# Patient Record
Sex: FEMALE | Race: WHITE | Employment: UNEMPLOYED | URBAN - METROPOLITAN AREA
[De-identification: names, ages, dates, MRNs, and addresses within clinical notes are randomized per-mention and may not be internally consistent; named-entity substitution may affect disease eponyms.]

---

## 2017-12-05 ENCOUNTER — OFFICE VISIT (OUTPATIENT)
Dept: INTERNAL MEDICINE CLINIC | Age: 37
End: 2017-12-05

## 2017-12-05 VITALS
RESPIRATION RATE: 14 BRPM | HEIGHT: 62 IN | SYSTOLIC BLOOD PRESSURE: 107 MMHG | DIASTOLIC BLOOD PRESSURE: 76 MMHG | OXYGEN SATURATION: 99 % | WEIGHT: 160 LBS | TEMPERATURE: 98.2 F | BODY MASS INDEX: 29.44 KG/M2 | HEART RATE: 119 BPM

## 2017-12-05 DIAGNOSIS — E78.2 MIXED HYPERLIPIDEMIA: Primary | ICD-10-CM

## 2017-12-05 DIAGNOSIS — G44.59 OTHER COMPLICATED HEADACHE SYNDROME: ICD-10-CM

## 2017-12-05 DIAGNOSIS — E55.9 VITAMIN D DEFICIENCY: ICD-10-CM

## 2017-12-05 NOTE — PROGRESS NOTES
HISTORY OF PRESENT ILLNESS  Saadia Acosta is a 40 y.o. female. HPI   Patient reports head pressure. She notes Motrin does not help. She states she has migraines but this sensation is different. Patient notes sensitivity to light, nausea, and throat tightness with head pressure. Patient states she is eating and drinking okay without food or liquids getting stuck in throat. She states it feels she can not breathe. She denies checking BP during these episodes. She states the head pressure is intermittent. She states her anxiety is stable and not contributing. She expresses concern about aneurysm due to [place]. In addition, patient reports bilateral inner thigh pain. She denies exercising. She does have varicose veins that vascular would have done something for in the past   Hyperlipidemia:  Cardiovascular risk analysis - 40 y.o. female LDL goal is under 130. ROS: not currently on medications, no TIA's, no chest pain on exertion, no dyspnea on exertion, no swelling of ankles. Tolerating meds, no myalgias or other side effects noted  New concerns: Last LDL was 168, 9/20/2016. Review of Systems   All other systems reviewed and are negative. Physical Exam   Constitutional: She is oriented to person, place, and time. She appears well-developed and well-nourished. HENT:   Head: Normocephalic and atraumatic. Right Ear: External ear normal.   Left Ear: External ear normal.   Nose: Nose normal.   Mouth/Throat: Oropharynx is clear and moist.   Eyes: Conjunctivae and EOM are normal.   Neck: Normal range of motion. Neck supple. Carotid bruit is not present. No thyroid mass and no thyromegaly present. Cardiovascular: Normal rate, regular rhythm, S1 normal, S2 normal, normal heart sounds and intact distal pulses. Pulmonary/Chest: Effort normal and breath sounds normal.   Abdominal: Soft. Normal appearance and bowel sounds are normal. There is no hepatosplenomegaly. There is no tenderness.    Musculoskeletal: Normal range of motion. Neurological: She is alert and oriented to person, place, and time. She has normal strength. No cranial nerve deficit or sensory deficit. Coordination normal.   Skin: Skin is warm, dry and intact. No abrasion and no rash noted. Psychiatric: She has a normal mood and affect. Her behavior is normal. Judgment and thought content normal.   Nursing note and vitals reviewed. ASSESSMENT and PLAN  Diagnoses and all orders for this visit:    1. Mixed hyperlipidemia  Stable, patient not on meds. I do not recommend any change in medications. We will see what it comes [facility]  -     CBC W/O DIFF  -     METABOLIC PANEL, COMPREHENSIVE  -     LIPID PANEL  -     TSH 3RD GENERATION  -     CBC W/O DIFF  -     METABOLIC PANEL, COMPREHENSIVE  -     LIPID PANEL    2. Other complicated headache syndrome  Suspect muscles around neck are tight/squeezing and causing head pressure. Patient can swallow liquids and foods alright. Encouraged patient to follow up with neurology as she has strong FH of aneurysm and she is concerned about her risks  -     REFERRAL TO NEUROLOGY    3. Vitamin D deficiency  Will monitor.  -     VITAMIN D, 25 HYDROXY      Strongly encouraged patient to participate in exercise regularly. FH of aneurysm. lab results and schedule of future lab studies reviewed with patient  reviewed diet, exercise and weight control    Written by Jostin Novoa, as dictated by Tatianna Rivera MD.     Current diagnosis and concerns discussed with pt at length. Understands risks and benefits or current treatment plan and medications and accepts the treatment and medication with any possible risks. Pt asks appropriate questions which were answered. Pt instructed to call with any concerns or problems.

## 2017-12-06 ENCOUNTER — OFFICE VISIT (OUTPATIENT)
Dept: NEUROLOGY | Age: 37
End: 2017-12-06

## 2017-12-06 ENCOUNTER — TELEPHONE (OUTPATIENT)
Dept: INTERNAL MEDICINE CLINIC | Age: 37
End: 2017-12-06

## 2017-12-06 VITALS
BODY MASS INDEX: 29.72 KG/M2 | SYSTOLIC BLOOD PRESSURE: 112 MMHG | RESPIRATION RATE: 20 BRPM | HEIGHT: 62 IN | WEIGHT: 161.5 LBS | DIASTOLIC BLOOD PRESSURE: 60 MMHG

## 2017-12-06 DIAGNOSIS — R11.0 NAUSEA: ICD-10-CM

## 2017-12-06 DIAGNOSIS — R51.9 PRESSURE IN HEAD: Primary | ICD-10-CM

## 2017-12-06 DIAGNOSIS — Z82.49 FAMILY HISTORY OF CEREBRAL ANEURYSM: ICD-10-CM

## 2017-12-06 DIAGNOSIS — R41.3 MEMORY DIFFICULTY: ICD-10-CM

## 2017-12-06 RX ORDER — IBUPROFEN 200 MG
TABLET ORAL
COMMUNITY

## 2017-12-06 RX ORDER — ACETAMINOPHEN 325 MG/1
TABLET ORAL
COMMUNITY

## 2017-12-06 NOTE — PROGRESS NOTES
Name:  Natalie Alves      :  1980    PCP:   Iliana Lake MD      Referring:  As above  MRN:   [id number]    Chief Complaint:   Chief Complaint   Patient presents with    Eye Problem    Dizziness    Headache    Memory Loss       HISTORY OF PRESENT ILLNESS:     This is a 40 y.o. female with PMHx migraine headache who presents for evaluation of head pressure and associated symptoms which she feels are different than her typical migraine. She says for the past 2 weeks she's been having pressure sensation inside her head, not a headache. Also has zapping pain, split second occurring in random areas of head. Generally only happens once per day, not multiple times in a day, or may not have it for a few days. Has been intermittent nausea x 1 month but feels more nauseated when has head pressure. No vomiting. Also complaining of intermittent throat tightness in the past few weeks, unrelated to head pressure, but can drink and eat foods without choking. Describes having trouble with memory over the past few weeks, feeling more forgetful, sometimes not able to find right word or saying wrong word. She denies feeling anxious or depressed. Denies any recent head injury. Denies any recent, significant increase in life stressors. Reports that father passed away suddenly from cerebral aneurysm in mid 46s and sister also had ruptured cerebral aneurysm in mid 35s (had craniotomy). Pt concerned she may have aneurysm. Complete Review of Systems: chest pain, constipation, fatigue, joint pain, dyspnea, muscle pain; otherwise as noted in HPI     No Known Allergies  Past Medical History:   Diagnosis Date    Encounter for insertion of mirena IUD 5/6/15    Mirena placed    Headache     migraines    Hypercholesterolemia      Current Outpatient Prescriptions   Medication Sig Dispense Refill    acetaminophen (TYLENOL) 325 mg tablet Take  by mouth every four (4) hours as needed for Pain.       ibuprofen (MOTRIN) 200 mg tablet Take  by mouth.  levonorgestrel (MIRENA) 20 mcg/24 hr (5 years) IUD 1 Each by IntraUTERine route once.  ranitidine (ZANTAC) 150 mg tablet Take 1 Tab by mouth two (2) times a day. 30 Tab 0     Past Surgical History:   Procedure Laterality Date    HX DILATION AND CURETTAGE       Family History   Problem Relation Age of Onset    Stroke Father     Elevated Lipids Father     Arthritis-osteo Mother     Cancer Paternal Uncle      abdomen    Diabetes Paternal Uncle     Cancer Paternal Grandmother      susana    Hypertension Neg Hx      Social History     Social History    Marital status:      Spouse name: N/A    Number of children: N/A    Years of education: N/A     Occupational History    Not on file. Social History Main Topics    Smoking status: Never Smoker    Smokeless tobacco: Never Used    Alcohol use No    Drug use: No    Sexual activity: Yes     Partners: Male     Birth control/ protection: IUD     Other Topics Concern    Not on file     Social History Narrative       PHYSICAL EXAM  Vitals:    12/06/17 1328   BP: 112/60   Resp: 20   Weight: 73.3 kg (161 lb 8 oz)   Height: 5' 2\" (1.575 m)       General:  Alert, cooperative, NAD   Head:  Normocephalic, atraumatic. Eyes:  Conjunctivae/corneas clear   Lungs:  Heart:  Non labored breathing  Regular rate, rhythm   Extremities: No edema.    Skin: No rashes    Neurologic Exam       Language: normal  Memory:  Alert, oriented to person, place, situation    Cranial Nerves:  I: smell Not tested   II: visual fields Full to confrontation   II: pupils Equal, round, reactive to light   II: optic disc No papilledema   III,VII: ptosis none   III,IV,VI: extraocular muscles  normal   V: facial light touch sensation  normal   VII: facial muscle function  symmetric   VIII: hearing symmetric   IX: soft palate elevation  normal   XI: sternocleidomastoid strength 5/5   XII: tongue  midline      Motor: normal bulk, tone, strength in all exts  Sensory: intact to LT, PP, temp, vibration x 4 exts   Cerebellar: no rest, postural, or intention tremor  Normal FNF and H-Shin bilaterally  Reflexes: 2+ throughout  Plantar response: not examined  Gait: normal gait including tandem  Romberg negative     Reviewed last clinic note by PCP regarding above symptoms    ASSESSMENT AND PLAN    ICD-10-CM ICD-9-CM    1. Pressure in head R51 784.0 CTA HEAD NECK W CONT   2. Nausea R11.0 787.02 CTA HEAD NECK W CONT   3. Family history of cerebral aneurysm Z82.49 V17.1 CTA HEAD NECK W CONT   4. Memory difficulty R41.3 780.93          40 y.o. female with 2-3 week hx of pressure in head (not headache per pt), intermittent zapping sensations on scalp, nausea over past 1 month, episodic throat tightness. The episodic throat tightness/ choking sensation and zapping sensations on scalp sound more like stress-related symptoms but she denies any ongoing stressors, anxiety, or depression. Has strong FHx aneurysm as described above. D/w her checking MRA vs CTA in regards to evaluating for aneurysm but she has moderate to severe claustrophobia and preferred to go with CTA. Placed that order, will see her back in 2-3 weeks to go over results.         Signed By: Mabel Denson MD     December 6, 2017

## 2017-12-06 NOTE — TELEPHONE ENCOUNTER
Referral Request     Patient was seen by Dr Sage Seay Neurology 12/06/2017  Instead of Dr Derek Lawrence request an updated referral to reflect.     Patient has also been referred to have an CT Scan    Dx R51./ R11.0 / Z82.49 / & CPT 20517

## 2017-12-06 NOTE — MR AVS SNAPSHOT
Visit Information Date & Time Provider Department Dept. Phone Encounter #  
 12/6/2017  1:00 PM Rakesh Holden MD [facility] Neurology [facility] [phone] [id number] Follow-up Instructions Return in about 3 weeks (around 12/27/2017). Routing History Follow-up and Disposition History Upcoming Health Maintenance Date Due DTaP/Tdap/Td series (1 - Tdap) 10/15/2001 Influenza Age 5 to Adult 8/1/2017 PAP AKA CERVICAL CYTOLOGY 5/5/2020 Allergies as of 12/6/2017  Review Complete On: 12/5/2017 By: Keven Kaur LPN No Known Allergies Current Immunizations  Never Reviewed No immunizations on file. Not reviewed this visit You Were Diagnosed With   
  
 Codes Comments Pressure in head    -  Primary ICD-10-CM: P70 ICD-9-CM: 784.0 Nausea     ICD-10-CM: R11.0 ICD-9-CM: 787.02 Family history of cerebral aneurysm     ICD-10-CM: Z82.49 
ICD-9-CM: V17.1 Memory difficulty     ICD-10-CM: R41.3 ICD-9-CM: 780.93 Vitals BP Resp Height(growth percentile) Weight(growth percentile) BMI OB Status 112/60 20 5' 2\" (1.575 m) 161 lb 8 oz (73.3 kg) 29.54 kg/m2 IUD Smoking Status Never Smoker Vitals History BMI and BSA Data Body Mass Index Body Surface Area  
 29.54 kg/m 2 1.79 m 2 Preferred Pharmacy Pharmacy Name Phone [facility] [address]  [address] [phone] Your Updated Medication List  
  
   
This list is accurate as of: 12/6/17  2:18 PM.  Always use your most recent med list.  
  
  
  
  
 ibuprofen 200 mg tablet Commonly known as:  MOTRIN Take  by mouth.  
  
 levonorgestrel 20 mcg/24 hr (5 years) Iud  
Commonly known as:  MIRENA  
1 Each by IntraUTERine route once. raNITIdine 150 mg tablet Commonly known as:  ZANTAC Take 1 Tab by mouth two (2) times a day. TYLENOL 325 mg tablet Generic drug:  acetaminophen Take  by mouth every four (4) hours as needed for Pain. Follow-up Instructions Return in about 3 weeks (around 12/27/2017). To-Do List   
 12/06/2017 Imaging:  CTA HEAD NECK W CONT Patient Instructions [address] What is a living will? A living will is a legal form you use to write down the kind of care you want at the end of your life. It is used by the health professionals who will treat you if you aren't able to decide for yourself. If you put your wishes in writing, your loved ones and others will know what kind of care you want. They won't need to guess. This can ease your mind and be helpful to others. A living will is not the same as an estate or property will. An estate will explains what you want to happen with your money and property after you die. Is a living will a legal document? A living will is a legal document. Each state has its own laws about living yeager. If you move to another state, make sure that your living will is legal in the state where you now live. Or you might use a universal form that has been approved by many states. This kind of form can sometimes be completed and stored online. Your electronic copy will then be available wherever you have a connection to the Internet. In most cases, doctors will respect your wishes even if you have a form from a different state. · You don't need an  to complete a living will. But legal advice can be helpful if your state's laws are unclear, your health history is complicated, or your family can't agree on what should be in your living will. · You can change your living will at any time. Some people find that their wishes about end-of-life care change as their health changes. · In addition to making a living will, think about completing a medical power of  form.  This form lets you name the person you want to make end-of-life treatment decisions for you (your \"health care agent\") if you're not able to. Many hospitals and nursing homes will give you the forms you need to complete a living will and a medical power of . · Your living will is used only if you can't make or communicate decisions for yourself anymore. If you become able to make decisions again, you can accept or refuse any treatment, no matter what you wrote in your living will. · Your state may offer an online registry. This is a place where you can store your living will online so the doctors and nurses who need to treat you can find it right away. What should you think about when creating a living will? Talk about your end-of-life wishes with your family members and your doctor. Let them know what you want. That way the people making decisions for you won't be surprised by your choices. Think about these questions as you make your living will: · Do you know enough about life support methods that might be used? If not, talk to your doctor so you know what might be done if you can't breathe on your own, your heart stops, or you're unable to swallow. · What things would you still want to be able to do after you receive life-support methods? Would you want to be able to walk? To speak? To eat on your own? To live without the help of machines? · If you have a choice, where do you want to be cared for? In your home? At a hospital or nursing home? · Do you want certain Restorationism practices performed if you become very ill? · If you have a choice at the end of your life, where would you prefer to die? At home? In a hospital or nursing home? Somewhere else? · Would you prefer to be buried or cremated? · Do you want your organs to be donated after you die? What should you do with your living will? · Make sure that your family members and your health care agent have copies of your living will. · Give your doctor a copy of your living will to keep in your medical record. If you have more than one doctor, make sure that each one has a copy. · You may want to put a copy of your living will where it can be easily found. Where can you learn more? Go to http://cahna-vanessa.info/. Enter H969 in the search box to learn more about \"Learning About Living [place]. \" Current as of: September 24, 2016 Content Version: 11.4 © 0348-0732 [employer]. Care instructions adapted under license by [employer] (which disclaims liability or warranty for this information). If you have questions about a medical condition or this instruction, always ask your healthcare professional. [address] any warranty or liability for your use of this information. Advance Directives: Care Instructions Your Care Instructions An advance directive is a legal way to state your wishes at the end of your life. It tells your family and your doctor what to do if you can no longer say what you want. There are two main types of advance directives. You can change them any time that your wishes change. · A living will tells your family and your doctor your wishes about life support and other treatment. · A durable power of  for health care lets you name a person to make treatment decisions for you when you can't speak for yourself. This person is called a health care agent. If you do not have an advance directive, decisions about your medical care may be made by a doctor or a  who doesn't know you. It may help to think of an advance directive as a gift to the people who care for you. If you have one, they won't have to make tough decisions by themselves. Follow-up care is a key part of your treatment and safety.  Be sure to make and go to all appointments, and call your doctor if you are having problems. It's also a good idea to know your test results and keep a list of the medicines you take. How can you care for yourself at home? · Discuss your wishes with your loved ones and your doctor. This way, there are no surprises. · Many states have a unique form. Or you might use a universal form that has been approved by many states. This kind of form can sometimes be completed and stored online. Your electronic copy will then be available wherever you have a connection to the Internet. In most cases, doctors will respect your wishes even if you have a form from a different state. · You don't need a  to do an advance directive. But you may want to get legal advice. · Think about these questions when you prepare an advance directive: ¨ Who do you want to make decisions about your medical care if you are not able to? Many people choose a family member or close friend. ¨ Do you know enough about life support methods that might be used? If not, talk to your doctor so you understand. ¨ What are you most afraid of that might happen? You might be afraid of having pain, losing your independence, or being kept alive by machines. ¨ Where would you prefer to die? Choices include your home, a hospital, or a nursing home. ¨ Would you like to have information about hospice care to support you and your family? ¨ Do you want to donate organs when you die? ¨ Do you want certain Sabianist practices performed before you die? If so, put your wishes in the advance directive. · Read your advance directive every year, and make changes as needed. When should you call for help? Be sure to contact your doctor if you have any questions. Where can you learn more? Go to http://chana-vanessa.info/. Enter R264 in the search box to learn more about \"Advance Directives: Care Instructions. \" Current as of: September 24, 2016 Content Version: 11.4 © 2792-3769 Health[employer]. Care instructions adapted under license by [employer] (which disclaims liability or warranty for this information). If you have questions about a medical condition or this instruction, always ask your healthcare professional. [address] any warranty or liability for your use of this information. Introducing hospitals & HEALTH SERVICES! Jessica Pappas introduces [employer] patient portal. Now you can access parts of your medical record, email your doctor's office, and request medication refills online. 1. In your internet browser, go to [email or web address] 2. Click on the First Time User? Click Here link in the Sign In box. You will see the New Member Sign Up page. 3. Enter your [employer] Access Code exactly as it appears below. You will not need to use this code after youve completed the sign-up process. If you do not sign up before the expiration date, you must request a new code. · [employer] Access Code: [email or web address] Expires: 3/6/2018  2:16 PM 
 
4. Enter the last four digits of your Social Security Number (xxxx) and Date of Birth (mm/dd/yyyy) as indicated and click Submit. You will be taken to the next sign-up page. 5. Create a [employer] ID. This will be your [employer] login ID and cannot be changed, so think of one that is secure and easy to remember. 6. Create a [employer] password. You can change your password at any time. 7. Enter your Password Reset Question and Answer. This can be used at a later time if you forget your password. 8. Enter your e-mail address. You will receive e-mail notification when new information is available in [address] 9. Click Sign Up. You can now view and download portions of your medical record. 10. Click the Download Summary menu link to download a portable copy of your medical information.  
 
If you have questions, please visit the Frequently Asked Questions section of the Guided Interventions. Remember, "[employer]"hart is NOT to be used for urgent needs. For medical emergencies, dial 911. Now available from your iPhone and Android! Please provide this summary of care documentation to your next provider. Your primary care clinician is listed as Criss Hutton. If you have any questions after today's visit, please call [phone].

## 2017-12-06 NOTE — PATIENT INSTRUCTIONS
Learning About Living [place]  What is a living will? A living will is a legal form you use to write down the kind of care you want at the end of your life. It is used by the health professionals who will treat you if you aren't able to decide for yourself. If you put your wishes in writing, your loved ones and others will know what kind of care you want. They won't need to guess. This can ease your mind and be helpful to others. A living will is not the same as an estate or property will. An estate will explains what you want to happen with your money and property after you die. Is a living will a legal document? A living will is a legal document. Each state has its own laws about living yeager. If you move to another state, make sure that your living will is legal in the state where you now live. Or you might use a universal form that has been approved by many states. This kind of form can sometimes be completed and stored online. Your electronic copy will then be available wherever you have a connection to the Internet. In most cases, doctors will respect your wishes even if you have a form from a different state. · You don't need an  to complete a living will. But legal advice can be helpful if your state's laws are unclear, your health history is complicated, or your family can't agree on what should be in your living will. · You can change your living will at any time. Some people find that their wishes about end-of-life care change as their health changes. · In addition to making a living will, think about completing a medical power of  form. This form lets you name the person you want to make end-of-life treatment decisions for you (your \"health care agent\") if you're not able to. Many hospitals and nursing homes will give you the forms you need to complete a living will and a medical power of .   · Your living will is used only if you can't make or communicate decisions for yourself anymore. If you become able to make decisions again, you can accept or refuse any treatment, no matter what you wrote in your living will. · Your state may offer an online registry. This is a place where you can store your living will online so the doctors and nurses who need to treat you can find it right away. What should you think about when creating a living will? Talk about your end-of-life wishes with your family members and your doctor. Let them know what you want. That way the people making decisions for you won't be surprised by your choices. Think about these questions as you make your living will:  · Do you know enough about life support methods that might be used? If not, talk to your doctor so you know what might be done if you can't breathe on your own, your heart stops, or you're unable to swallow. · What things would you still want to be able to do after you receive life-support methods? Would you want to be able to walk? To speak? To eat on your own? To live without the help of machines? · If you have a choice, where do you want to be cared for? In your home? At a hospital or nursing home? · Do you want certain Sabianism practices performed if you become very ill? · If you have a choice at the end of your life, where would you prefer to die? At home? In a hospital or nursing home? Somewhere else? · Would you prefer to be buried or cremated? · Do you want your organs to be donated after you die? What should you do with your living will? · Make sure that your family members and your health care agent have copies of your living will. · Give your doctor a copy of your living will to keep in your medical record. If you have more than one doctor, make sure that each one has a copy. · You may want to put a copy of your living will where it can be easily found. Where can you learn more? Go to http://chana-vanessa.info/.   Enter H472 in the search box to learn more about \"Learning About Living [place] So. \"  Current as of: September 24, 2016  Content Version: 11.4  © 9587-3933 [employer]. Care instructions adapted under license by [employer] (which disclaims liability or warranty for this information). If you have questions about a medical condition or this instruction, always ask your healthcare professional. [address] any warranty or liability for your use of this information. Advance Directives: Care Instructions  Your Care Instructions  An advance directive is a legal way to state your wishes at the end of your life. It tells your family and your doctor what to do if you can no longer say what you want. There are two main types of advance directives. You can change them any time that your wishes change. · A living will tells your family and your doctor your wishes about life support and other treatment. · A durable power of  for health care lets you name a person to make treatment decisions for you when you can't speak for yourself. This person is called a health care agent. If you do not have an advance directive, decisions about your medical care may be made by a doctor or a  who doesn't know you. It may help to think of an advance directive as a gift to the people who care for you. If you have one, they won't have to make tough decisions by themselves. Follow-up care is a key part of your treatment and safety. Be sure to make and go to all appointments, and call your doctor if you are having problems. It's also a good idea to know your test results and keep a list of the medicines you take. How can you care for yourself at home? · Discuss your wishes with your loved ones and your doctor. This way, there are no surprises. · Many states have a unique form. Or you might use a universal form that has been approved by many states. This kind of form can sometimes be completed and stored online.  Your electronic copy will then be available wherever you have a connection to the Internet. In most cases, doctors will respect your wishes even if you have a form from a different state. · You don't need a  to do an advance directive. But you may want to get legal advice. · Think about these questions when you prepare an advance directive:  ¨ Who do you want to make decisions about your medical care if you are not able to? Many people choose a family member or close friend. ¨ Do you know enough about life support methods that might be used? If not, talk to your doctor so you understand. ¨ What are you most afraid of that might happen? You might be afraid of having pain, losing your independence, or being kept alive by machines. ¨ Where would you prefer to die? Choices include your home, a hospital, or a nursing home. ¨ Would you like to have information about hospice care to support you and your family? ¨ Do you want to donate organs when you die? ¨ Do you want certain Jew practices performed before you die? If so, put your wishes in the advance directive. · Read your advance directive every year, and make changes as needed. When should you call for help? Be sure to contact your doctor if you have any questions. Where can you learn more? Go to http://chana-vanessa.info/. Enter R264 in the search box to learn more about \"Advance Directives: Care Instructions. \"  Current as of: September 24, 2016  Content Version: 11.4  © 2831-0152 [employer]. Care instructions adapted under license by [employer] (which disclaims liability or warranty for this information). If you have questions about a medical condition or this instruction, always ask your healthcare professional. [address] any warranty or liability for your use of this information.

## 2017-12-06 NOTE — TELEPHONE ENCOUNTER
Referral obtained and faxed to Dr Annabelle Gonzalez office at [phone]. Auth # [id number]  35 visits 12/6/17-12/6/18.   [employer] back dates 14 days

## 2017-12-07 LAB
25(OH)D3+25(OH)D2 SERPL-MCNC: 10.4 NG/ML (ref 30–100)
ALBUMIN SERPL-MCNC: 4.3 G/DL (ref 3.5–5.5)
ALBUMIN/GLOB SERPL: 1.4 {RATIO} (ref 1.2–2.2)
ALP SERPL-CCNC: 67 IU/L (ref 39–117)
ALT SERPL-CCNC: 13 IU/L (ref 0–32)
AST SERPL-CCNC: 14 IU/L (ref 0–40)
BILIRUB SERPL-MCNC: 0.6 MG/DL (ref 0–1.2)
BUN SERPL-MCNC: 12 MG/DL (ref 6–20)
BUN/CREAT SERPL: 17 (ref 9–23)
CALCIUM SERPL-MCNC: 9.3 MG/DL (ref 8.7–10.2)
CHLORIDE SERPL-SCNC: 101 MMOL/L (ref 96–106)
CHOLEST SERPL-MCNC: 251 MG/DL (ref 100–199)
CO2 SERPL-SCNC: 24 MMOL/L (ref 18–29)
CREAT SERPL-MCNC: 0.69 MG/DL (ref 0.57–1)
ERYTHROCYTE [DISTWIDTH] IN BLOOD BY AUTOMATED COUNT: 13.8 % (ref 12.3–15.4)
GFR SERPLBLD CREATININE-BSD FMLA CKD-EPI: 112 ML/MIN/1.73
GFR SERPLBLD CREATININE-BSD FMLA CKD-EPI: 129 ML/MIN/1.73
GLOBULIN SER CALC-MCNC: 3.1 G/DL (ref 1.5–4.5)
GLUCOSE SERPL-MCNC: 91 MG/DL (ref 65–99)
HCT VFR BLD AUTO: 40.1 % (ref 34–46.6)
HDLC SERPL-MCNC: 43 MG/DL
HGB BLD-MCNC: 13.7 G/DL (ref 11.1–15.9)
INTERPRETATION, 910389: NORMAL
LDLC SERPL CALC-MCNC: 192 MG/DL (ref 0–99)
MCH RBC QN AUTO: 29.8 PG (ref 26.6–33)
MCHC RBC AUTO-ENTMCNC: 34.2 G/DL (ref 31.5–35.7)
MCV RBC AUTO: 87 FL (ref 79–97)
PLATELET # BLD AUTO: 292 X10E3/UL (ref 150–379)
POTASSIUM SERPL-SCNC: 4.3 MMOL/L (ref 3.5–5.2)
PROT SERPL-MCNC: 7.4 G/DL (ref 6–8.5)
RBC # BLD AUTO: 4.6 X10E6/UL (ref 3.77–5.28)
SODIUM SERPL-SCNC: 142 MMOL/L (ref 134–144)
TRIGL SERPL-MCNC: 80 MG/DL (ref 0–149)
TSH SERPL DL<=0.005 MIU/L-ACNC: 1.97 UIU/ML (ref 0.45–4.5)
VLDLC SERPL CALC-MCNC: 16 MG/DL (ref 5–40)
WBC # BLD AUTO: 5.4 X10E3/UL (ref 3.4–10.8)

## 2017-12-10 NOTE — PROGRESS NOTES
Please call labs are off   1) lipids are high and we should think about a very low dose of cholesterol medicine if she is willing- the main side effect is some people can get muscle pain but we stop if that happens  2) prescription vitamin D 50,000 units each week for 4 months    Rest of labs are good!

## 2017-12-11 ENCOUNTER — TELEPHONE (OUTPATIENT)
Dept: INTERNAL MEDICINE CLINIC | Age: 37
End: 2017-12-11

## 2017-12-11 RX ORDER — ERGOCALCIFEROL 1.25 MG/1
50000 CAPSULE ORAL
Qty: 4 CAP | Refills: 3 | Status: SHIPPED | OUTPATIENT
Start: 2017-12-11 | End: 2018-01-10

## 2017-12-11 RX ORDER — ATORVASTATIN CALCIUM 10 MG/1
10 TABLET, FILM COATED ORAL DAILY
Qty: 30 TAB | Refills: 3 | Status: SHIPPED | OUTPATIENT
Start: 2017-12-11 | End: 2018-01-10

## 2017-12-11 NOTE — TELEPHONE ENCOUNTER
----- Message from Judi Barkley sent at 12/11/2017  9:58 AM EST -----  Regarding: Dr Donohue/ Phone  Pt requesting call back at [phone]. Blood vessels popped in Left eye and lids are swollen. Wants to be seen today.

## 2017-12-11 NOTE — TELEPHONE ENCOUNTER
Contacted pt and advised of appt today at 3:00, pt declined as her children are getting off bus at that time. Appt provided for 12-12 per request. Advised of pt lab result with recommendation of cholesterol medication and script for Vit D. With rechecks in 4 months. Pt understood and agrees to plan.

## 2017-12-12 ENCOUNTER — OFFICE VISIT (OUTPATIENT)
Dept: INTERNAL MEDICINE CLINIC | Age: 37
End: 2017-12-12

## 2017-12-12 VITALS
SYSTOLIC BLOOD PRESSURE: 105 MMHG | OXYGEN SATURATION: 98 % | DIASTOLIC BLOOD PRESSURE: 50 MMHG | RESPIRATION RATE: 14 BRPM | TEMPERATURE: 98.2 F | HEART RATE: 78 BPM | HEIGHT: 62 IN

## 2017-12-12 DIAGNOSIS — E78.2 MIXED HYPERLIPIDEMIA: Primary | ICD-10-CM

## 2017-12-12 DIAGNOSIS — E55.9 VITAMIN D DEFICIENCY: ICD-10-CM

## 2017-12-12 DIAGNOSIS — M25.569 KNEE PAIN, UNSPECIFIED CHRONICITY, UNSPECIFIED LATERALITY: ICD-10-CM

## 2017-12-12 NOTE — PROGRESS NOTES
HISTORY OF PRESENT ILLNESS  Javy Loyd is a 40 y.o. female. HPI   Patient reports Sunday she woke up with red eye. She states it looked bloody and she was worried. Patient denies taking Vitamin D supplements. Patient reports grinding noise in knees with movement. Hyperlipidemia:  Cardiovascular risk analysis - 40 y.o. female LDL goal is under 130. ROS: taking medications as instructed, no medication side effects noted, no TIA's, no chest pain on exertion, no dyspnea on exertion, no swelling of ankles. Tolerating meds, no myalgias or other side effects noted  New concerns: Last LDL was 192 and HDL 43. She reports FH of high cholesterol with her father. Review of Systems   All other systems reviewed and are negative. Physical Exam   Constitutional: She is oriented to person, place, and time. She appears well-developed and well-nourished. HENT:   Head: Normocephalic and atraumatic. Right Ear: External ear normal.   Left Ear: External ear normal.   Nose: Nose normal.   Mouth/Throat: Oropharynx is clear and moist.   Eyes: Conjunctivae and EOM are normal.   Neck: Normal range of motion. Neck supple. Carotid bruit is not present. No thyroid mass and no thyromegaly present. Cardiovascular: Normal rate, regular rhythm, S1 normal, S2 normal, normal heart sounds and intact distal pulses. Pulmonary/Chest: Effort normal and breath sounds normal.   Abdominal: Soft. Normal appearance and bowel sounds are normal. There is no hepatosplenomegaly. There is no tenderness. Musculoskeletal: Normal range of motion. Neurological: She is alert and oriented to person, place, and time. She has normal strength. No cranial nerve deficit or sensory deficit. Coordination normal.   Skin: Skin is warm, dry and intact. No abrasion and no rash noted. Psychiatric: She has a normal mood and affect. Her behavior is normal. Judgment and thought content normal.   Nursing note and vitals reviewed.       ASSESSMENT and PLAN  Diagnoses and all orders for this visit:    1. Mixed hyperlipidemia  Counseled patient on dietary changes to naturally decrease cholesterol levels. Encouraged patient to facilitate exercise regularly for 6+ months to see positive impact in cholesterol levels. Patient should strive for 150 minutes of exercise weekly consistently. She refuses medications today in office. 2. Knee pain, unspecified chronicity, unspecified laterality  Strongly encouraged patient to exercise leg muscles. Reassured patient it is not abnormal.    3. Vitamin D deficiency  Vitamin D levels low, patient will start on supplements. Reassured patient her eye will be alright and she should not fear infection. lab results and schedule of future lab studies reviewed with patient  reviewed diet, exercise and weight control    Written by Frank Bender, as dictated by Carson Mcnally MD.     Current diagnosis and concerns discussed with pt at length. Understands risks and benefits or current treatment plan and medications and accepts the treatment and medication with any possible risks. Pt asks appropriate questions which were answered. Pt instructed to call with any concerns or problems.

## 2017-12-14 ENCOUNTER — TELEPHONE (OUTPATIENT)
Dept: NEUROLOGY | Age: 37
End: 2017-12-14

## 2017-12-14 NOTE — TELEPHONE ENCOUNTER
CT angiogram is equally sensitive to MRI in terms of identifying any aneurysm. If she can tolerate a regular (not open MRI) then I can change the order but she will have to cancel the CT appt.  Let me know

## 2017-12-14 NOTE — TELEPHONE ENCOUNTER
----- Message from Calvin Ornelas sent at 12/14/2017  2:28 PM EST -----  Regarding: /Telephone  Pt wants to know to change from getting a Cat Scan to MRI. Pt states she has an appt tomorrow. Advised her that the MRI may not be approved before tomorrow that she may have to r/s the appt. Best contact number is [phone].

## 2017-12-14 NOTE — TELEPHONE ENCOUNTER
Contacted patient back and informed her of Dr Aida Villafuerte response. Patient states she will continue with the CTA tomorrow.

## 2017-12-14 NOTE — TELEPHONE ENCOUNTER
Dr. Garfield Wylie- it looks like an MRA was discussed but a CTA was ordered due to her claustrophobia. Please advise.

## 2017-12-15 ENCOUNTER — HOSPITAL ENCOUNTER (OUTPATIENT)
Dept: CT IMAGING | Age: 37
Discharge: HOME | End: 2017-12-15
Attending: PSYCHIATRY & NEUROLOGY
Payer: COMMERCIAL

## 2017-12-15 DIAGNOSIS — R51.9 PRESSURE IN HEAD: ICD-10-CM

## 2017-12-15 DIAGNOSIS — Z82.49 FAMILY HISTORY OF CEREBRAL ANEURYSM: ICD-10-CM

## 2017-12-15 DIAGNOSIS — R11.0 NAUSEA: ICD-10-CM

## 2017-12-15 PROCEDURE — 70498 CT ANGIOGRAPHY NECK: CPT

## 2017-12-15 PROCEDURE — 74011636320 HC RX REV CODE- 636/320: Performed by: PSYCHIATRY & NEUROLOGY

## 2017-12-15 RX ADMIN — IOPAMIDOL 100 ML: 755 INJECTION, SOLUTION INTRAVENOUS at 10:22

## 2017-12-21 ENCOUNTER — TELEPHONE (OUTPATIENT)
Dept: NEUROLOGY | Age: 37
End: 2017-12-21

## 2017-12-21 NOTE — TELEPHONE ENCOUNTER
----- Message from Sylvia Moreira sent at 12/21/2017 11:55 AM EST -----  Regarding: Dr Chyna Bran (p) [phone], Patient was returning the nurses call ,regarding her CT test results.

## 2017-12-21 NOTE — TELEPHONE ENCOUNTER
----- Message from Aaron Hollis sent at 12/20/2017  4:07 PM EST -----  Regarding: Dr. Barrow Anchors  The pt is requesting a call back with her recent CAT scan results. Best contact number is [phone].

## 2017-12-21 NOTE — TELEPHONE ENCOUNTER
Contacted patient and scheduled her follow up for Friday, December 22, 2017 08:20 AM to discuss results.

## 2017-12-22 ENCOUNTER — OFFICE VISIT (OUTPATIENT)
Dept: NEUROLOGY | Age: 37
End: 2017-12-22

## 2017-12-22 VITALS
BODY MASS INDEX: 29.63 KG/M2 | HEART RATE: 70 BPM | WEIGHT: 161 LBS | DIASTOLIC BLOOD PRESSURE: 70 MMHG | SYSTOLIC BLOOD PRESSURE: 110 MMHG | HEIGHT: 62 IN | OXYGEN SATURATION: 98 %

## 2017-12-22 DIAGNOSIS — Z82.49 FHX: CEREBRAL ANEURYSM: ICD-10-CM

## 2017-12-22 DIAGNOSIS — R41.3 MEMORY PROBLEM: Primary | ICD-10-CM

## 2017-12-22 NOTE — PROGRESS NOTES
Interval HPI:   This is a 40 y.o. female who is following up for     Chief Complaint   Patient presents with    Results     Reviewed images of CTA Head/ Neck: normal (no aneurysms, AVMs, and normal pre- and post-contrast CT head scan). Continues to describe trouble with forgetfulness (i.e forgetting how to do routine activities, make her coffee). Frequently saying the wrong thing (children correction her). She continues doing all ADLs, has good long-term memory/ recall. Reports getting about 8 hrs sleep per night. Denies any depression, anxiety, stressors. Denies ever having symptoms of ADD (says did well in school, no attention/ focus problems). Was recently dx with severe Vit Deficiency (10) and recently started supplementation      Brief ROS: as above or otherwise negative  There have been no significant changes in PMHx, PSHx, SHx except as noted above. No Known Allergies  Current Outpatient Prescriptions   Medication Sig Dispense Refill    ergocalciferol (ERGOCALCIFEROL) 50,000 unit capsule Take 1 Cap by mouth every seven (7) days for 30 days. 4 Cap 3    acetaminophen (TYLENOL) 325 mg tablet Take  by mouth every four (4) hours as needed for Pain.  ibuprofen (MOTRIN) 200 mg tablet Take  by mouth.  levonorgestrel (MIRENA) 20 mcg/24 hr (5 years) IUD 1 Each by IntraUTERine route once.  atorvastatin (LIPITOR) 10 mg tablet Take 1 Tab by mouth daily for 30 days. 30 Tab 3    ranitidine (ZANTAC) 150 mg tablet Take 1 Tab by mouth two (2) times a day. 30 Tab 0       Physical Exam  Blood pressure 110/70, pulse 70, height 5' 2\" (1.575 m), weight 73 kg (161 lb), SpO2 98 %. No acute distress  Neck: no stiffness  Skin: no rashes    Focused Neurological Exam     Mental status: Alert and oriented to person, place situation. Language: normal fluency and comprehension; no dysarthria.       CNs:   Visual fields grossly normal  Extraocular movements intact, no nystagmus  Face appears symmetric and facial strength normal.    Hearing is intact to casual conversation. Sensory: intact light touch in all 4 extremities  Motor: Normal bulk and strength in all 4 extremities. Reflexes: DTRs are symmetric, 2+   Gait: normal    Impression      ICD-10-CM ICD-9-CM    1. Memory problem R41.3 780.93    2. FHx: cerebral aneurysm Z82.49 V17.1        40 y.o. female with FHx cerebral aneurysm with 3-4 week symptoms of pressure in head (not headache per pt), intermittent zapping sensation on scalp, episodic nausea, episodic throat tightness. Discussed with pt CTA Head/ neck was normal, no aneurysms or other abnormalities. D/w her that many of her symptoms are likely due to the very low Vit D and to continue working on correcting that. If she has any persisting cognitive symptoms after Vit D back into the normal range, it would be reasonable to check MRI Brain at that time. Follow will be as needed.        Signed By: Shaila Dow MD     December 22, 2017

## 2017-12-22 NOTE — MR AVS SNAPSHOT
Visit Information Date & Time Provider Department Dept. Phone Encounter #  
 12/22/2017  8:20 AM [facility] [phone] [id number] Follow-up Instructions Return if symptoms worsen or fail to improve. Upcoming Health Maintenance Date Due DTaP/Tdap/Td series (1 - Tdap) 10/15/2001 Influenza Age 5 to Adult 8/1/2017 PAP AKA CERVICAL CYTOLOGY 5/5/2020 Allergies as of 12/22/2017  Review Complete On: 12/22/2017 By: Gavin Coyle LPN No Known Allergies Current Immunizations  Never Reviewed No immunizations on file. Not reviewed this visit You Were Diagnosed With   
  
 Codes Comments Memory problem    -  Primary ICD-10-CM: R41.3 ICD-9-CM: 780.93 FHx: cerebral aneurysm     ICD-10-CM: Z82.49 
ICD-9-CM: V17.1 Vitals BP Pulse Height(growth percentile) Weight(growth percentile) SpO2 BMI  
 110/70 (BP 1 Location: Left arm, BP Patient Position: Sitting) 70 5' 2\" (1.575 m) 161 lb (73 kg) 98% 29.45 kg/m2 OB Status Smoking Status IUD Never Smoker BMI and BSA Data Body Mass Index Body Surface Area  
 29.45 kg/m 2 1.79 m 2 Preferred Pharmacy Pharmacy Name Phone [facility] [address] [phone] Your Updated Medication List  
  
   
This list is accurate as of: 12/22/17  8:52 AM.  Always use your most recent med list.  
  
  
  
  
 atorvastatin 10 mg tablet Commonly known as:  LIPITOR Take 1 Tab by mouth daily for 30 days. ergocalciferol 50,000 unit capsule Commonly known as:  ERGOCALCIFEROL Take 1 Cap by mouth every seven (7) days for 30 days. ibuprofen 200 mg tablet Commonly known as:  MOTRIN Take  by mouth.  
  
 levonorgestrel 20 mcg/24 hr (5 years) Iud  
Commonly known as:  MIRENA  
1 Each by IntraUTERine route once. raNITIdine 150 mg tablet Commonly known as:  ZANTAC Take 1 Tab by mouth two (2) times a day. TYLENOL 325 mg tablet Generic drug:  acetaminophen Take  by mouth every four (4) hours as needed for Pain. Follow-up Instructions Return if symptoms worsen or fail to improve. Introducing [facility]! Shivam Garcia introduces kWhOURS patient portal. Now you can access parts of your medical record, email your doctor's office, and request medication refills online. 1. In your internet browser, go to [email or web address] 2. Click on the First Time User? Click Here link in the Sign In box. You will see the New Member Sign Up page. 3. Enter your kWhOURS Access Code exactly as it appears below. You will not need to use this code after youve completed the sign-up process. If you do not sign up before the expiration date, you must request a new code. · kWhOURS Access Code: [email or web address] Expires: 3/6/2018  2:16 PM 
 
4. Enter the last four digits of your Social Security Number (xxxx) and Date of Birth (mm/dd/yyyy) as indicated and click Submit. You will be taken to the next sign-up page. 5. Create a kWhOURS ID. This will be your kWhOURS login ID and cannot be changed, so think of one that is secure and easy to remember. 6. Create a kWhOURS password. You can change your password at any time. 7. Enter your Password Reset Question and Answer. This can be used at a later time if you forget your password. 8. Enter your e-mail address. You will receive e-mail notification when new information is available in [address] 9. Click Sign Up. You can now view and download portions of your medical record. 10. Click the Download Summary menu link to download a portable copy of your medical information. If you have questions, please visit the Frequently Asked Questions section of the kWhOURS website.  Remember, kWhOURS is NOT to be used for urgent needs. For medical emergencies, dial 911. Now available from your iPhone and Android! Please provide this summary of care documentation to your next provider. Your primary care clinician is listed as Gladis Perkins. If you have any questions after today's visit, please call [phone].

## 2018-04-23 ENCOUNTER — TELEPHONE (OUTPATIENT)
Dept: OBGYN CLINIC | Age: 38
End: 2018-04-23

## 2018-04-23 NOTE — TELEPHONE ENCOUNTER
Call received at 827am    40year old patient last seen in the office on 6/10/15. Patient had IUD placed on 5/6/15. Patient calling to say that she has had painful intercourse, and vaginal pressure. Patient denies vaginal bleeding and report pain at bottom of stomach and top left of stomach. Patient placed on the schedule for 3:00PM ultrasound and 3:30PM follow up ( ok per AM)    Patient advised and verbalized understanding.

## 2018-07-07 NOTE — TELEPHONE ENCOUNTER
Referral obtained and faxed to Dr Kanwal Cheng office at [phone]. Auth # [id number]  40 visits 12/6/17-12/6/18.   [employer] back dates 14 days John is a previously healthy 8y/o in with bowel and bladder incontinence since Thursday (2 days pta). Grandma states he had decreased appetite and PO intake on Wednesday at a BBQ. On Thursday afternoon, he had 2 episodes of fecal incontinence. The stool was mostly liquid and John states he saw a streak of bright red blood. He had 2 more episodes of fecal incontinence Friday (1 day pta), no blood. On Friday he also had a new headache treated with Motrin. He woke up Saturday morning having urinated in his bed the night before, per [facility] he never peeds the bed. He also had another episodes of fecal incontinence today on the way to the Emergency department. Denisha states pt felt hot this morning but they did not take his temperature. He sometimes has abd pain with stooling; no history of constipation. He states he has some pain with urination for the past few days, states it feels like "it's coming out too fast." He currently has a headache he describes as "feels like water in my head."  Grandma states that John has had a change in his gait for the past 2 days. She describes it as "stumbling" that has been consistent for the past 2 days. He has no history of walking problems and no history of trauma. No sick contacts or recent travel. All vaccines are UTD.  He has been living with grandma for 4 years, moved in because mom had complicated pregnancies and health problems associated with multiple falls. Mom is legal guardian, gave consent for treatment. John is a previously healthy 8y/o in with bowel and bladder incontinence since Thursday (2 days pta). Grandma states he had decreased appetite and PO intake on Wednesday at a BBQ. On Thursday afternoon, he had 2 episodes of fecal incontinence. The stool was mostly liquid and John states he saw a streak of bright red blood. He had 2 more episodes of fecal incontinence Friday (1 day pta), no blood. On Friday he also had a new headache treated with Motrin. He woke up Saturday morning having urinated in his bed the night before, per [facility] he never peeds the bed. He also had another episodes of fecal incontinence today on the way to the Emergency department. Denisha states pt felt hot this morning but they did not take his temperature. He sometimes has abd pain with stooling; no history of constipation. He states he has some pain with urination for the past few days, states it feels like "it's coming out too fast." He currently has a headache he describes as "feels like water in my head."  Grandma states that John has had a change in his gait for the past 2 days. She describes it as "stumbling" that has been consistent for the past 2 days. He has no history of walking problems and no history of trauma. No sick contacts or recent travel. All vaccines are UTD.  He has been living with grandma for 4 years, moved in because mom had complicated pregnancies and health problems associated with multiple falls. Mom is legal guardian, gave consent for treatment.     ED Course:  Normal neurological exam except for ataxic gait, not wide based. Patient noted to have anal wink on rectal exam. Neurology consulted, who recommended MRI head and lumbosacral spine with and without contrast, but suggest symptoms may be secondary to viral gastroenteritis. ESR 23 and CRP 24. Na 132 Cl 96 HCO3 21. UA neg and GI PCR pending collection. NS bolus x1. Admitted for MRI.

## 2019-10-14 ENCOUNTER — APPOINTMENT (OUTPATIENT)
Dept: CT IMAGING | Age: 39
End: 2019-10-14
Attending: EMERGENCY MEDICINE
Payer: COMMERCIAL

## 2019-10-14 ENCOUNTER — HOSPITAL ENCOUNTER (EMERGENCY)
Age: 39
Discharge: HOME | End: 2019-10-14
Attending: EMERGENCY MEDICINE
Payer: COMMERCIAL

## 2019-10-14 VITALS
HEART RATE: 76 BPM | OXYGEN SATURATION: 100 % | BODY MASS INDEX: 24.63 KG/M2 | TEMPERATURE: 97.8 F | WEIGHT: 139 LBS | DIASTOLIC BLOOD PRESSURE: 64 MMHG | SYSTOLIC BLOOD PRESSURE: 97 MMHG | HEIGHT: 63 IN | RESPIRATION RATE: 16 BRPM

## 2019-10-14 DIAGNOSIS — R51.9 ACUTE NONINTRACTABLE HEADACHE, UNSPECIFIED HEADACHE TYPE: Primary | ICD-10-CM

## 2019-10-14 LAB
ALBUMIN SERPL-MCNC: 3.8 G/DL (ref 3.5–5)
ALBUMIN/GLOB SERPL: 1.1 {RATIO} (ref 1.1–2.2)
ALP SERPL-CCNC: 61 U/L (ref 45–117)
ALT SERPL-CCNC: 19 U/L (ref 12–78)
ANION GAP BLD CALC-SCNC: 18 MMOL/L (ref 10–20)
ANION GAP SERPL CALC-SCNC: 5 MMOL/L (ref 5–15)
AST SERPL-CCNC: 17 U/L (ref 15–37)
ATRIAL RATE: 68 BPM
BASOPHILS # BLD: 0 K/UL (ref 0–0.1)
BASOPHILS NFR BLD: 0 % (ref 0–1)
BILIRUB SERPL-MCNC: 1.1 MG/DL (ref 0.2–1)
BUN BLD-MCNC: 14 MG/DL (ref 9–20)
BUN SERPL-MCNC: 14 MG/DL (ref 6–20)
BUN/CREAT SERPL: 21 (ref 12–20)
CA-I BLD-MCNC: 1.15 MMOL/L (ref 1.12–1.32)
CALCIUM SERPL-MCNC: 9.1 MG/DL (ref 8.5–10.1)
CALCULATED P AXIS, ECG09: 58 DEGREES
CALCULATED R AXIS, ECG10: 39 DEGREES
CALCULATED T AXIS, ECG11: 32 DEGREES
CHLORIDE BLD-SCNC: 101 MMOL/L (ref 98–107)
CHLORIDE SERPL-SCNC: 107 MMOL/L (ref 97–108)
CO2 BLD-SCNC: 24 MMOL/L (ref 21–32)
CO2 SERPL-SCNC: 26 MMOL/L (ref 21–32)
CREAT BLD-MCNC: 0.5 MG/DL (ref 0.6–1.3)
CREAT SERPL-MCNC: 0.68 MG/DL (ref 0.55–1.02)
DIAGNOSIS, 93000: NORMAL
DIFFERENTIAL METHOD BLD: ABNORMAL
EOSINOPHIL # BLD: 0.1 K/UL (ref 0–0.4)
EOSINOPHIL NFR BLD: 1 % (ref 0–7)
ERYTHROCYTE [DISTWIDTH] IN BLOOD BY AUTOMATED COUNT: 13.1 % (ref 11.5–14.5)
GLOBULIN SER CALC-MCNC: 3.6 G/DL (ref 2–4)
GLUCOSE BLD STRIP.AUTO-MCNC: 90 MG/DL (ref 65–100)
GLUCOSE BLD-MCNC: 86 MG/DL (ref 65–100)
GLUCOSE SERPL-MCNC: 95 MG/DL (ref 65–100)
HCT VFR BLD AUTO: 40.5 % (ref 35–47)
HCT VFR BLD CALC: 41 % (ref 35–47)
HGB BLD-MCNC: 13.6 G/DL (ref 11.5–16)
IMM GRANULOCYTES # BLD AUTO: 0.1 K/UL (ref 0–0.04)
IMM GRANULOCYTES NFR BLD AUTO: 1 % (ref 0–0.5)
INR PPP: 1 (ref 0.9–1.1)
LYMPHOCYTES # BLD: 1.1 K/UL (ref 0.8–3.5)
LYMPHOCYTES NFR BLD: 9 % (ref 12–49)
MCH RBC QN AUTO: 30.3 PG (ref 26–34)
MCHC RBC AUTO-ENTMCNC: 33.6 G/DL (ref 30–36.5)
MCV RBC AUTO: 90.2 FL (ref 80–99)
MONOCYTES # BLD: 0.8 K/UL (ref 0–1)
MONOCYTES NFR BLD: 7 % (ref 5–13)
NEUTS SEG # BLD: 10.4 K/UL (ref 1.8–8)
NEUTS SEG NFR BLD: 82 % (ref 32–75)
NRBC # BLD: 0 K/UL (ref 0–0.01)
NRBC BLD-RTO: 0 PER 100 WBC
P-R INTERVAL, ECG05: 178 MS
PLATELET # BLD AUTO: 235 K/UL (ref 150–400)
PMV BLD AUTO: 10.6 FL (ref 8.9–12.9)
POTASSIUM BLD-SCNC: 3.7 MMOL/L (ref 3.5–5.1)
POTASSIUM SERPL-SCNC: 3.8 MMOL/L (ref 3.5–5.1)
PROT SERPL-MCNC: 7.4 G/DL (ref 6.4–8.2)
PROTHROMBIN TIME: 10.3 SEC (ref 9–11.1)
Q-T INTERVAL, ECG07: 426 MS
QRS DURATION, ECG06: 84 MS
QTC CALCULATION (BEZET), ECG08: 452 MS
RBC # BLD AUTO: 4.49 M/UL (ref 3.8–5.2)
SERVICE CMNT-IMP: ABNORMAL
SERVICE CMNT-IMP: NORMAL
SODIUM BLD-SCNC: 138 MMOL/L (ref 136–145)
SODIUM SERPL-SCNC: 138 MMOL/L (ref 136–145)
VENTRICULAR RATE, ECG03: 68 BPM
WBC # BLD AUTO: 12.5 K/UL (ref 3.6–11)

## 2019-10-14 PROCEDURE — 74011250636 HC RX REV CODE- 250/636: Performed by: EMERGENCY MEDICINE

## 2019-10-14 PROCEDURE — 80053 COMPREHEN METABOLIC PANEL: CPT

## 2019-10-14 PROCEDURE — 85025 COMPLETE CBC W/AUTO DIFF WBC: CPT

## 2019-10-14 PROCEDURE — 70450 CT HEAD/BRAIN W/O DYE: CPT

## 2019-10-14 PROCEDURE — 96375 TX/PRO/DX INJ NEW DRUG ADDON: CPT

## 2019-10-14 PROCEDURE — 74011636320 HC RX REV CODE- 636/320: Performed by: RADIOLOGY

## 2019-10-14 PROCEDURE — 96374 THER/PROPH/DIAG INJ IV PUSH: CPT

## 2019-10-14 PROCEDURE — 82962 GLUCOSE BLOOD TEST: CPT

## 2019-10-14 PROCEDURE — 93005 ELECTROCARDIOGRAM TRACING: CPT

## 2019-10-14 PROCEDURE — 99285 EMERGENCY DEPT VISIT HI MDM: CPT

## 2019-10-14 PROCEDURE — 36415 COLL VENOUS BLD VENIPUNCTURE: CPT

## 2019-10-14 PROCEDURE — 85610 PROTHROMBIN TIME: CPT

## 2019-10-14 PROCEDURE — 70496 CT ANGIOGRAPHY HEAD: CPT

## 2019-10-14 PROCEDURE — 80047 BASIC METABLC PNL IONIZED CA: CPT

## 2019-10-14 RX ORDER — DIPHENHYDRAMINE HYDROCHLORIDE 50 MG/ML
25 INJECTION, SOLUTION INTRAMUSCULAR; INTRAVENOUS
Status: COMPLETED | OUTPATIENT
Start: 2019-10-14 | End: 2019-10-14

## 2019-10-14 RX ORDER — DEXAMETHASONE SODIUM PHOSPHATE 10 MG/ML
10 INJECTION INTRAMUSCULAR; INTRAVENOUS
Status: COMPLETED | OUTPATIENT
Start: 2019-10-14 | End: 2019-10-14

## 2019-10-14 RX ORDER — KETOROLAC TROMETHAMINE 30 MG/ML
30 INJECTION, SOLUTION INTRAMUSCULAR; INTRAVENOUS
Status: COMPLETED | OUTPATIENT
Start: 2019-10-14 | End: 2019-10-14

## 2019-10-14 RX ORDER — METOCLOPRAMIDE HYDROCHLORIDE 5 MG/ML
10 INJECTION INTRAMUSCULAR; INTRAVENOUS
Status: COMPLETED | OUTPATIENT
Start: 2019-10-14 | End: 2019-10-14

## 2019-10-14 RX ADMIN — KETOROLAC TROMETHAMINE 30 MG: 30 INJECTION, SOLUTION INTRAMUSCULAR at 15:20

## 2019-10-14 RX ADMIN — IOPAMIDOL 100 ML: 755 INJECTION, SOLUTION INTRAVENOUS at 14:08

## 2019-10-14 RX ADMIN — DEXAMETHASONE SODIUM PHOSPHATE 10 MG: 10 INJECTION, SOLUTION INTRAMUSCULAR; INTRAVENOUS at 14:33

## 2019-10-14 RX ADMIN — METOCLOPRAMIDE 10 MG: 5 INJECTION, SOLUTION INTRAMUSCULAR; INTRAVENOUS at 14:33

## 2019-10-14 RX ADMIN — SODIUM CHLORIDE 1000 ML: 900 INJECTION, SOLUTION INTRAVENOUS at 14:28

## 2019-10-14 RX ADMIN — DIPHENHYDRAMINE HYDROCHLORIDE 25 MG: 50 INJECTION, SOLUTION INTRAMUSCULAR; INTRAVENOUS at 14:33

## 2019-10-14 NOTE — ED NOTES
Bedside and Verbal shift change report given to Kaelyn Faulkner (oncoming nurse) by Princess Brock (offgoing nurse). Report included the following information SBAR, Kardex, MAR and Recent Results.

## 2019-10-14 NOTE — DISCHARGE INSTRUCTIONS

## 2019-10-14 NOTE — PROGRESS NOTES
Spiritual Care Assessment/Progress Note  [address]      NAME: Tomas Flores      MRN: [id number]  AGE: 45 y.o. SEX: female  Nondenominational Affiliation: Holiness   Language: English     10/14/2019     Total Time (in minutes): 14     Spiritual Assessment begun in [facility] EMERGENCY DEPT through conversation with:         [x]Patient        [x] Family    [] Friend(s)        Reason for Consult: Other (comment), Emergency Department visit(Code S )     Spiritual beliefs: (Please include comment if needed)     [x] Identifies with a swetha tradition:   Temple       [] Supported by a swetha community:            [] Claims no spiritual orientation:           [] Seeking spiritual identity:                [] Adheres to an individual form of spirituality:           [] Not able to assess:                           Identified resources for coping:      [] Prayer                               [] Music                  [] Guided Imagery     [] Family/friends                 [] Pet visits     [] Devotional reading                         [] Unknown     [] Other:                                             Interventions offered during this visit: (See comments for more details)    Patient Interventions: Prayer (assurance of)     Family/Friend(s):  Affirmation of emotions/emotional suffering, Affirmation of swetha, Iconic (affirming the presence of God/Higher Power), Catharsis/review of pertinent events in supportive environment, Prayer (assurance of)     Plan of Care:     [] Support spiritual and/or cultural needs    [] Support AMD and/or advance care planning process      [] Support grieving process   [] Coordinate Rites and/or Rituals    [] Coordination with community clergy   [] No spiritual needs identified at this time   [] Detailed Plan of Care below (See Comments)  [] Make referral to Music Therapy  [] Make referral to Pet Therapy     [] Make referral to Addiction services  [] Make referral to [facility]  [] Make referral to Spiritual Care Partner  [] No future visits requested        [x] Follow up visits as needed     Comments: Responded to code S in ER. Miss Tucker's  was in the room, she was out for tests. Provided listening presence as he shared about this morning into the afternoon. They have 4 children ages 13-20. He shared they have an Confucianism swetha and appreciate prayers. When Miss Kobe Edmonds returned provided assurance of prayers and Advised of  Availability.    Visited by: Qiana Ramirez, MS., [id number] ([id number])

## 2019-10-14 NOTE — ED PROVIDER NOTES
51-year-old female with a history of migraine headaches presents with severe onset of a headache that started at 10 AM this morning upon awakening. States that this is the worst headache of her life. She rates it 10 out of 10. She has associated photophobia. She had a syncopal episode this morning as well. She states that she has had nausea and vomiting as well. Her family history is significant for a father who had a ruptured aneurysm.            Past Medical History:   Diagnosis Date    Encounter for insertion of mirena IUD 5/6/15    Mirena placed    Headache     migraines    Hypercholesterolemia        Past Surgical History:   Procedure Laterality Date    HX DILATION AND CURETTAGE           Family History:   Problem Relation Age of Onset    Stroke Father     Elevated Lipids Father     Arthritis-osteo Mother     Cancer Paternal Uncle         abdomen    Diabetes Paternal Uncle     Cancer Paternal Grandmother         susana    Hypertension Neg Hx        Social History     Socioeconomic History    Marital status:      Spouse name: Not on file    Number of children: Not on file    Years of education: Not on file    Highest education level: Not on file   Occupational History    Not on file   Social Needs    Financial resource strain: Not on file    Food insecurity:     Worry: Not on file     Inability: Not on file    Transportation needs:     Medical: Not on file     Non-medical: Not on file   Tobacco Use    Smoking status: Never Smoker    Smokeless tobacco: Never Used   Substance and Sexual Activity    Alcohol use: No    Drug use: No    Sexual activity: Yes     Partners: Male     Birth control/protection: IUD   Lifestyle    Physical activity:     Days per week: Not on file     Minutes per session: Not on file    Stress: Not on file   Relationships    Social connections:     Talks on phone: Not on file     Gets together: Not on file     Attends Restorationist service: Not on file Active member of club or organization: Not on file     Attends meetings of clubs or organizations: Not on file     Relationship status: Not on file    Intimate partner violence:     Fear of current or ex partner: Not on file     Emotionally abused: Not on file     Physically abused: Not on file     Forced sexual activity: Not on file   Other Topics Concern    Not on file   Social History Narrative    Not on file         ALLERGIES: Patient has no known allergies. Review of Systems   Constitutional: Negative for chills and fever. HENT: Negative for ear pain and sore throat. Eyes: Negative for pain. Respiratory: Negative for chest tightness and shortness of breath. Cardiovascular: Negative for chest pain. Gastrointestinal: Negative for abdominal pain. Genitourinary: Negative for flank pain. Musculoskeletal: Negative for back pain. Skin: Negative for rash. Neurological: Negative for headaches. All other systems reviewed and are negative. Vitals:    10/14/19 1338   BP: 110/63   Pulse: 62   Resp: 15   Temp: 97.8 °F (36.6 °C)   SpO2: 99%   Weight: 63 kg (139 lb)   Height: 5' 3\" (1.6 m)            Physical Exam   Constitutional: She is oriented to person, place, and time. No distress. HENT:   Head: Normocephalic and atraumatic. Mouth/Throat: Oropharynx is clear and moist.   Eyes: Pupils are equal, round, and reactive to light. Conjunctivae are normal. No scleral icterus. Neck: Neck supple. No tracheal deviation present. Cardiovascular: Normal rate, regular rhythm and intact distal pulses. Pulmonary/Chest: Effort normal. No respiratory distress. She has no wheezes. She has no rales. Abdominal: Soft. She exhibits no distension. There is no tenderness. Genitourinary:   Genitourinary Comments: deferred   Musculoskeletal: She exhibits no edema or deformity. Neurological: She is alert and oriented to person, place, and time. No cranial nerve deficit.    Normal motor and sensation Skin: Skin is warm and dry. Psychiatric: She has a normal mood and affect. Nursing note and vitals reviewed. MDM  Number of Diagnoses or Management Options  Acute nonintractable headache, unspecified headache type:   Diagnosis management comments: 80-year-old female presents with your sudden onset of a headache after awakening. CT and CTA were negative. She was within 6-hour of headache onset thus CT very high sensitivity for intracranial hemorrhage. Patient treated with Reglan, Benadryl, normal saline with improvement of her symptoms to 6 out of 10. Added Toradol. Patient will be discharged with neurology follow-up. She was given strict return precautions. Procedures        [employer].  Mercy Etienne MD

## 2019-10-14 NOTE — ED TRIAGE NOTES
Patient arrives via ems from home, reports margine since this am and syncopal episode about one hour ago while walking to the restroom. Patient tearful with towel covering eyes. Reports nausea.  Per  patient has never had a headache like this, patient does not have a neurologist; patient and  concerned for anyersym, states '\"it runs in her family\"

## 2021-11-24 ENCOUNTER — TRANSCRIBE ORDER (OUTPATIENT)
Dept: SCHEDULING | Age: 41
End: 2021-11-24

## 2021-11-24 DIAGNOSIS — M54.12 CERVICAL RADICULOPATHY: Primary | ICD-10-CM

## 2021-12-26 ENCOUNTER — HOSPITAL ENCOUNTER (EMERGENCY)
Age: 41
Discharge: HOME | End: 2021-12-26
Attending: EMERGENCY MEDICINE
Payer: COMMERCIAL

## 2021-12-26 ENCOUNTER — APPOINTMENT (OUTPATIENT)
Dept: GENERAL RADIOLOGY | Age: 41
End: 2021-12-26
Attending: NURSE PRACTITIONER
Payer: COMMERCIAL

## 2021-12-26 VITALS
SYSTOLIC BLOOD PRESSURE: 108 MMHG | HEART RATE: 102 BPM | HEIGHT: 60 IN | BODY MASS INDEX: 30.43 KG/M2 | WEIGHT: 155 LBS | RESPIRATION RATE: 16 BRPM | DIASTOLIC BLOOD PRESSURE: 74 MMHG | OXYGEN SATURATION: 99 % | TEMPERATURE: 98.6 F

## 2021-12-26 DIAGNOSIS — U07.1 COVID-19: Primary | ICD-10-CM

## 2021-12-26 LAB
COVID-19 RAPID TEST, COVR: DETECTED
DEPRECATED S PYO AG THROAT QL EIA: NEGATIVE
SOURCE, COVRS: ABNORMAL

## 2021-12-26 PROCEDURE — 87880 STREP A ASSAY W/OPTIC: CPT

## 2021-12-26 PROCEDURE — 87070 CULTURE OTHR SPECIMN AEROBIC: CPT

## 2021-12-26 PROCEDURE — 71045 X-RAY EXAM CHEST 1 VIEW: CPT

## 2021-12-26 PROCEDURE — 99281 EMR DPT VST MAYX REQ PHY/QHP: CPT

## 2021-12-26 PROCEDURE — 87635 SARS-COV-2 COVID-19 AMP PRB: CPT

## 2021-12-26 RX ORDER — ALBUTEROL SULFATE 90 UG/1
1 AEROSOL, METERED RESPIRATORY (INHALATION)
Qty: 18 G | Refills: 0 | Status: SHIPPED | OUTPATIENT
Start: 2021-12-26 | End: 2022-06-29

## 2021-12-26 NOTE — ED PROVIDER NOTES
This is a 35-year-old female who presents ambulatory to the emergency room with complaints of cough, general body aches and pains, congestion and a headache for approximately 3 days. Patient arrives with her daughter who has the same symptoms. Over the past 3 days her symptoms have worsened. Is not vaccinated for Covid. Denies having any exposure to the Covid virus. Denies any chest pain, shortness of breath, dizziness, or chills. Took her last dose of Tylenol today around 11:00 with no improvement of her symptoms. Comes to the emergency room today with suspicion for COVID-19 virus. States her pain is 4 out of 10 in general myalgias. There are no further complaints at this time. No primary care provider on file. No past medical history on file. No past surgical history on file. Kwan Arana was evaluated in the Emergency Department on (Not on file) for the symptoms described in the history of present illness. He/she was evaluated in the context of the global COVID-19 pandemic, which necessitated consideration that the patient might be at risk for infection with the SARS-CoV-2 virus that causes COVID-19. Institutional protocols and algorithms that pertain to the evaluation of patients at risk for COVID-19 are in a state of rapid change based on information released by regulatory bodies including the CDC and federal and state organizations. These policies and algorithms were followed during the patient's care in the ED. Surrogate Decision Maker (Who do you want to make decisions for you in the event you are not able to?): Extended Emergency Contact Information  Primary Emergency Contact: Nabila Garcia  Home Phone: [phone]  Relation: Spouse               No past medical history on file. No past surgical history on file. No family history on file.     Social History     Socioeconomic History    Marital status: Not on file     Spouse name: Not on file    Number of children: Not on file    Years of education: Not on file    Highest education level: Not on file   Occupational History    Not on file   Tobacco Use    Smoking status: Not on file    Smokeless tobacco: Not on file   Substance and Sexual Activity    Alcohol use: Not on file    Drug use: Not on file    Sexual activity: Not on file   Other Topics Concern    Not on file   Social History Narrative    Not on file     Social Determinants of Health     Financial Resource Strain:     Difficulty of Paying Living Expenses: Not on file   Food Insecurity:     Worried About Running Out of Food in the Last Year: Not on file    Joel of Food in the Last Year: Not on file   Transportation Needs:     Lack of Transportation (Medical): Not on file    Lack of Transportation (Non-Medical): Not on file   Physical Activity:     Days of Exercise per Week: Not on file    Minutes of Exercise per Session: Not on file   Stress:     Feeling of Stress : Not on file   Social Connections:     Frequency of Communication with Friends and Family: Not on file    Frequency of Social Gatherings with Friends and Family: Not on file    Attends Restorationist Services: Not on file    Active Member of [address] [place] [address] or Organizations: Not on file    Attends Club or Organization Meetings: Not on file    Marital Status: Not on file   Intimate Partner Violence:     Fear of Current or Ex-Partner: Not on file    Emotionally Abused: Not on file    Physically Abused: Not on file    Sexually Abused: Not on file   Housing Stability:     Unable to Pay for Housing in the Last Year: Not on file    Number of [place] in the Last Year: Not on file    Unstable Housing in the Last Year: Not on file         ALLERGIES: Patient has no known allergies. Review of Systems   Constitutional: Positive for chills, fatigue and fever. Negative for appetite change and diaphoresis. HENT: Positive for congestion, sinus pressure and sinus pain.  Negative for ear discharge, ear pain, sore throat and trouble swallowing. Eyes: Negative for photophobia, pain, redness and visual disturbance. Respiratory: Positive for cough. Negative for chest tightness, shortness of breath and wheezing. Cardiovascular: Negative for chest pain and palpitations. Gastrointestinal: Negative for abdominal distention, abdominal pain, nausea and vomiting. Endocrine: Negative. Genitourinary: Negative for difficulty urinating, flank pain, frequency and urgency. Musculoskeletal: Positive for myalgias. Negative for back pain, neck pain and neck stiffness. Skin: Negative for color change, pallor, rash and wound. Allergic/Immunologic: Negative. Neurological: Positive for headaches. Negative for dizziness, speech difficulty and weakness. Hematological: Does not bruise/bleed easily. Psychiatric/Behavioral: Negative for behavioral problems. The patient is not nervous/anxious. Vitals:    12/26/21 1653   BP: 108/74   Pulse: (!) 102   Resp: 16   Temp: 98.6 °F (37 °C)   SpO2: 99%   Weight: 70.3 kg (155 lb)   Height: 5' (1.524 m)            Physical Exam  Vitals and nursing note reviewed. Constitutional:       General: She is not in acute distress. Appearance: Normal appearance. She is well-developed. She is not ill-appearing. HENT:      Head: Normocephalic and atraumatic. Right Ear: External ear normal.      Left Ear: External ear normal.      Nose: Congestion present. Mouth/Throat:      Mouth: Mucous membranes are moist.   Eyes:      General:         Right eye: No discharge. Left eye: No discharge. Conjunctiva/sclera: Conjunctivae normal.      Pupils: Pupils are equal, round, and reactive to light. Neck:      Vascular: No JVD. Trachea: No tracheal deviation. Cardiovascular:      Rate and Rhythm: Regular rhythm. Tachycardia present. Pulses: Normal pulses. Heart sounds: Normal heart sounds. No murmur heard. No gallop.     Pulmonary:      Effort: Pulmonary effort is normal. No respiratory distress. Breath sounds: Normal breath sounds. No wheezing or rales. Comments: Intermittent cough through assessment  Chest:      Chest wall: No tenderness. Abdominal:      General: Bowel sounds are normal. There is no distension. Palpations: Abdomen is soft. Tenderness: There is no abdominal tenderness. There is no guarding or rebound. Genitourinary:     Comments: Negative    Musculoskeletal:         General: Tenderness (general myalgias) present. Normal range of motion. Cervical back: Normal range of motion and neck supple. Skin:     General: Skin is warm and dry. Capillary Refill: Capillary refill takes less than 2 seconds. Coloration: Skin is not pale. Findings: No erythema or rash. Neurological:      General: No focal deficit present. Mental Status: She is alert and oriented to person, place, and time. Motor: No weakness. Coordination: Coordination normal.   Psychiatric:         Mood and Affect: Mood normal.         Behavior: Behavior normal.         Thought Content: Thought content normal.         Judgment: Judgment normal.          MDM  Number of Diagnoses or Management Options  Diagnosis management comments: Differential diagnosis includes COVID-19, pneumonia, viral syndrome and others. Amount and/or Complexity of Data Reviewed  Clinical lab tests: ordered  Tests in the radiology section of CPT®: ordered         Labs Reviewed   COVID-19 RAPID TEST - Abnormal; Notable for the following components:       Result Value    COVID-19 rapid test Detected (*)     All other components within normal limits   STREP AG SCREEN, GROUP A   CULTURE, THROAT     XR CHEST PORT    Result Date: 12/26/2021  No acute process. 6:57 PM  Pt has been reexamined. Pt has no new complaints, changes or physical findings. Care plan outlined and precautions discussed. All available results were reviewed with pt.  All medications were reviewed with pt. All of pt's questions and concerns were addressed. Pt agrees to F/U as instructed and agrees to return to ED upon further deterioration. Pt is ready to go home. Analilia Arnold NP    Please note that this dictation was completed with [email or web address], the computer voice recognition software. Quite often unanticipated grammatical, syntax, homophones, and other interpretive errors are inadvertently transcribed by the computer software. Please disregard these errors. Please excuse any errors that have escaped final proofreading. Thank you.     Procedures

## 2021-12-26 NOTE — Clinical Note
[facility]  [facility] EMERGENCY DEPT  [address] [id number] [phone]    Work/School Note    Date: 12/26/2021     To Whom It May concern:    Rosalee Rhodes was evaluated by the following provider(s):  Attending Provider: Dorsie Paget, MD  Nurse Practitioner: Chase Cotto virus is suspected. Per the CDC guidelines we recommend home isolation until the following conditions are all met:    1. At least 10 days have passed since symptoms first appeared and  2. At least 24 hours have passed since last fever without the use of fever-reducing medications and  3.  Symptoms (e.g., cough, shortness of breath) have improved      Sincerely,          Erich Keepers, NP

## 2021-12-26 NOTE — Clinical Note
[address]  [facility] EMERGENCY DEPT  [address] [id number]  [phone]    Work/School Note    Date: 12/26/2021     To Whom It May concern:    Gita Smalls was evaluated by the following provider(s):  Attending Provider: Margreta Simmonds, MD  Nurse Practitioner: Collette Oliver virus is suspected. Per the CDC guidelines we recommend home isolation until the following conditions are all met:    1. At least 10 days have passed since symptoms first appeared and  2. At least 24 hours have passed since last fever without the use of fever-reducing medications and  3.  Symptoms (e.g., cough, shortness of breath) have improved      Sincerely,          Denisse Harris, NP

## 2021-12-26 NOTE — ED TRIAGE NOTES
Pt to ED for HA, cough, fever, body aches, congestion x3 days.  Pt not vaccinated for covid     Last dose of tylenol at 1100

## 2021-12-27 ENCOUNTER — PATIENT OUTREACH (OUTPATIENT)
Dept: CASE MANAGEMENT | Age: 41
End: 2021-12-27

## 2021-12-27 NOTE — PROGRESS NOTES
Patient contacted regarding COVID-19 diagnosis. Discussed COVID-19 related testing which was available at this time. Test results were positive. Patient informed of results, if available? yes. Ambulatory Care Manager contacted the patient by telephone to perform post discharge assessment. Call within 2 business days of discharge: Yes Verified name and  with patient as identifiers. Provided introduction to self, and explanation of the CTN/ACM role, and reason for call due to risk factors for infection and/or exposure to COVID-19. Symptoms reviewed with patient who verbalized the following symptoms: fever, pain or aching joints, cough, chills or shaking, no new symptoms and no worsening symptoms      Due to no new or worsening symptoms encounter was not routed to provider for escalation. Discussed follow-up appointments. If no appointment was previously scheduled, appointment scheduling offered:  Patient will f/u with PCP for follow up. [facility] follow up appointment(s): No future appointments. Interventions to address risk factors: Assessment and support for treatment adherence and medication management-patient has not filled albuterol rx yet - educated on importance of filling and taking per MD orders. Advance Care Planning:   Does patient have an Advance Directive: not on file. Educated patient about risk for severe COVID-19 due to risk factors according to CDC guidelines. ACM reviewed discharge instructions, medical action plan and red flag symptoms with the patient who verbalized understanding. Discussed COVID vaccination status: yes. Education provided on COVID-19 vaccination as appropriate. Discussed exposure protocols and quarantine with CDC Guidelines. Patient was given an opportunity to verbalize any questions and concerns and agrees to contact ACM or health care provider for questions related to their healthcare.     Reviewed and educated patient on any new and changed medications related to discharge diagnosis     Was patient discharged with a pulse oximeter? yes Discussed and confirmed pulse oximeter discharge instructions and when to notify provider or seek emergency care. ACM provided contact information. Plan for follow-up call in 3-5 days based on severity of symptoms and risk factors.

## 2021-12-29 LAB
BACTERIA SPEC CULT: NORMAL
SERVICE CMNT-IMP: NORMAL

## 2021-12-30 ENCOUNTER — PATIENT OUTREACH (OUTPATIENT)
Dept: CASE MANAGEMENT | Age: 41
End: 2021-12-30

## 2022-03-18 PROBLEM — R11.0 NAUSEA: Status: ACTIVE | Noted: 2017-12-06

## 2022-03-18 PROBLEM — R41.3 MEMORY PROBLEM: Status: ACTIVE | Noted: 2017-12-22

## 2022-03-19 PROBLEM — Z82.49 FAMILY HISTORY OF CEREBRAL ANEURYSM: Status: ACTIVE | Noted: 2017-12-06

## 2022-03-19 PROBLEM — R41.3 MEMORY DIFFICULTY: Status: ACTIVE | Noted: 2017-12-06

## 2022-03-19 PROBLEM — Z82.49 FHX: CEREBRAL ANEURYSM: Status: ACTIVE | Noted: 2017-12-22

## 2022-03-20 PROBLEM — R51.9 PRESSURE IN HEAD: Status: ACTIVE | Noted: 2017-12-06

## 2022-05-06 ENCOUNTER — HOSPITAL ENCOUNTER (EMERGENCY)
Age: 42
Discharge: HOME | End: 2022-05-06
Attending: EMERGENCY MEDICINE

## 2022-05-06 VITALS
TEMPERATURE: 97.8 F | BODY MASS INDEX: 30.21 KG/M2 | SYSTOLIC BLOOD PRESSURE: 117 MMHG | DIASTOLIC BLOOD PRESSURE: 76 MMHG | WEIGHT: 160 LBS | RESPIRATION RATE: 16 BRPM | OXYGEN SATURATION: 100 % | HEART RATE: 71 BPM | HEIGHT: 61 IN

## 2022-05-06 DIAGNOSIS — G43.009 MIGRAINE WITHOUT AURA AND WITHOUT STATUS MIGRAINOSUS, NOT INTRACTABLE: Primary | ICD-10-CM

## 2022-05-06 PROCEDURE — 99284 EMERGENCY DEPT VISIT MOD MDM: CPT

## 2022-05-06 PROCEDURE — 96374 THER/PROPH/DIAG INJ IV PUSH: CPT

## 2022-05-06 PROCEDURE — 96361 HYDRATE IV INFUSION ADD-ON: CPT

## 2022-05-06 PROCEDURE — 74011250636 HC RX REV CODE- 250/636: Performed by: STUDENT IN AN ORGANIZED HEALTH CARE EDUCATION/TRAINING PROGRAM

## 2022-05-06 PROCEDURE — 96375 TX/PRO/DX INJ NEW DRUG ADDON: CPT

## 2022-05-06 RX ORDER — KETOROLAC TROMETHAMINE 30 MG/ML
30 INJECTION, SOLUTION INTRAMUSCULAR; INTRAVENOUS ONCE
Status: COMPLETED | OUTPATIENT
Start: 2022-05-06 | End: 2022-05-06

## 2022-05-06 RX ORDER — PROCHLORPERAZINE EDISYLATE 5 MG/ML
10 INJECTION INTRAMUSCULAR; INTRAVENOUS
Status: COMPLETED | OUTPATIENT
Start: 2022-05-06 | End: 2022-05-06

## 2022-05-06 RX ORDER — DIPHENHYDRAMINE HYDROCHLORIDE 50 MG/ML
25 INJECTION, SOLUTION INTRAMUSCULAR; INTRAVENOUS
Status: COMPLETED | OUTPATIENT
Start: 2022-05-06 | End: 2022-05-06

## 2022-05-06 RX ORDER — DEXAMETHASONE SODIUM PHOSPHATE 10 MG/ML
10 INJECTION INTRAMUSCULAR; INTRAVENOUS
Status: COMPLETED | OUTPATIENT
Start: 2022-05-06 | End: 2022-05-06

## 2022-05-06 RX ORDER — BUTALBITAL, ACETAMINOPHEN AND CAFFEINE 300; 40; 50 MG/1; MG/1; MG/1
1 CAPSULE ORAL
Qty: 10 CAPSULE | Refills: 0 | Status: SHIPPED | OUTPATIENT
Start: 2022-05-06

## 2022-05-06 RX ADMIN — PROCHLORPERAZINE EDISYLATE 10 MG: 5 INJECTION INTRAMUSCULAR; INTRAVENOUS at 17:35

## 2022-05-06 RX ADMIN — SODIUM CHLORIDE 1000 ML: 9 INJECTION, SOLUTION INTRAVENOUS at 17:34

## 2022-05-06 RX ADMIN — DIPHENHYDRAMINE HYDROCHLORIDE 25 MG: 50 INJECTION, SOLUTION INTRAMUSCULAR; INTRAVENOUS at 17:35

## 2022-05-06 RX ADMIN — KETOROLAC TROMETHAMINE 30 MG: 30 INJECTION, SOLUTION INTRAMUSCULAR; INTRAVENOUS at 17:35

## 2022-05-06 RX ADMIN — DEXAMETHASONE SODIUM PHOSPHATE 10 MG: 10 INJECTION, SOLUTION INTRAMUSCULAR; INTRAVENOUS at 17:36

## 2022-05-06 NOTE — ED TRIAGE NOTES
Pt complains of migraine since this morning. She woke up with it. Pt is vomiting as well. Hx of migraines but doesn't have any more migraine medication.

## 2022-05-06 NOTE — ED PROVIDER NOTES
42-year-old female with history of migraines and HLD presents to ED with 1 day of migraine. Patient reports she woke up this morning with the start of a migraine. She describes it as throbbing in nature and located on the right side behind her eye. She has history of frequent migraines that feels similar to this and used to be prescribed a medication for this but has run out. She is unsure what this medication is called. She notes associated nausea and a couple episodes of emesis. She tried to take a couple extra strength Tylenol with no relief. She denies any vision changes, numbness, tingling, fevers or chills. No history of head trauma. The history is provided by the patient. Past Medical History:   Diagnosis Date    Encounter for insertion of mirena IUD 5/6/15    Mirena placed    Headache     migraines    Hypercholesterolemia        Past Surgical History:   Procedure Laterality Date    HX DILATION AND CURETTAGE           Family History:   Problem Relation Age of Onset    Stroke Father     Elevated Lipids Father     OSTEOARTHRITIS Mother     Cancer Paternal Uncle         abdomen    Diabetes Paternal Uncle     Cancer Paternal Grandmother         susana    Hypertension Neg Hx        Social History     Socioeconomic History    Marital status:      Spouse name: Not on file    Number of children: Not on file    Years of education: Not on file    Highest education level: Not on file   Occupational History    Not on file   Tobacco Use    Smoking status: Never Smoker    Smokeless tobacco: Never Used   Substance and Sexual Activity    Alcohol use: No    Drug use: No    Sexual activity: Yes     Partners: Male     Birth control/protection: I.U.D.    Other Topics Concern    Not on file   Social History Narrative    Not on file     Social Determinants of Health     Financial Resource Strain:     Difficulty of Paying Living Expenses: Not on file   Food Insecurity:     Worried About Running Out of Food in the Last Year: Not on file    Ran Out of Food in the Last Year: Not on file   Transportation Needs:     Lack of Transportation (Medical): Not on file    Lack of Transportation (Non-Medical): Not on file   Physical Activity:     Days of Exercise per Week: Not on file    Minutes of Exercise per Session: Not on file   Stress:     Feeling of Stress : Not on file   Social Connections:     Frequency of Communication with Friends and Family: Not on file    Frequency of Social Gatherings with Friends and Family: Not on file    Attends Cheondoism Services: Not on file    Active Member of [address] [place] [address] [employer] or Organizations: Not on file    Attends Club or Organization Meetings: Not on file    Marital Status: Not on file   Intimate Partner Violence:     Fear of Current or Ex-Partner: Not on file    Emotionally Abused: Not on file    Physically Abused: Not on file    Sexually Abused: Not on file   Housing Stability:     Unable to Pay for Housing in the Last Year: Not on file    Number of [place] in the Last Year: Not on file    Unstable Housing in the Last Year: Not on file         ALLERGIES: Patient has no known allergies. Review of Systems   Constitutional: Negative for fever. HENT: Negative for congestion and sinus pressure. Respiratory: Negative for shortness of breath. Cardiovascular: Negative for chest pain. Gastrointestinal: Negative for nausea and vomiting. Genitourinary: Negative for dysuria. Musculoskeletal: Negative for myalgias. Neurological: Positive for headaches. Negative for dizziness. Hematological: Negative for adenopathy. Psychiatric/Behavioral: The patient is not nervous/anxious. All other systems reviewed and are negative. Vitals:    05/06/22 1537   BP: 120/78   Pulse: 79   Resp: 16   Temp: 96.9 °F (36.1 °C)   SpO2: 100%   Weight: 72.6 kg (160 lb)   Height: 5' 1\" (1.549 m)            Physical Exam  Vitals and nursing note reviewed. Constitutional:       General: She is not in acute distress. Appearance: Normal appearance. She is normal weight. HENT:      Head: Normocephalic and atraumatic. Eyes:      Extraocular Movements: Extraocular movements intact. Pupils: Pupils are equal, round, and reactive to light. Cardiovascular:      Rate and Rhythm: Normal rate and regular rhythm. Heart sounds: Normal heart sounds. Pulmonary:      Breath sounds: Normal breath sounds. Abdominal:      Palpations: Abdomen is soft. Tenderness: There is no abdominal tenderness. Lymphadenopathy:      Cervical: No cervical adenopathy. Skin:     General: Skin is warm and dry. Neurological:      General: No focal deficit present. Mental Status: She is alert and oriented to person, place, and time. Cranial Nerves: No cranial nerve deficit. Psychiatric:         Mood and Affect: Mood normal.         Behavior: Behavior normal.         Thought Content: Thought content normal.          MDM  Number of Diagnoses or Management Options  Migraine without aura and without status migrainosus, not intractable  Diagnosis management comments: 44-year-old female with history of migraines and HLD presents to ED with 1 day of migraine. Vital signs stable in triage. Physical exam unremarkable with cranial nerves intact bilaterally no focal deficits. Patient reports that the symptoms are similar to previous migraines that she gets frequently. She is unsure what medication she is typically prescribed but will follow-up with her primary care to get refill. While in ED she got migraine cocktail including IV fluids, IV Decadron, IV Benadryl, IV Toradol and IV Compazine. Patient reports relief of symptoms. She will be discharged with instructions for conservative care, follow-up and return precautions as well as prescription for Fioricet.        Amount and/or Complexity of Data Reviewed  Discuss the patient with other providers: yes Procedures

## 2022-06-29 ENCOUNTER — OFFICE VISIT (OUTPATIENT)
Dept: NEUROLOGY | Age: 42
End: 2022-06-29
Payer: COMMERCIAL

## 2022-06-29 VITALS
SYSTOLIC BLOOD PRESSURE: 128 MMHG | DIASTOLIC BLOOD PRESSURE: 80 MMHG | HEART RATE: 86 BPM | OXYGEN SATURATION: 99 % | RESPIRATION RATE: 14 BRPM

## 2022-06-29 DIAGNOSIS — M79.2 NEUROPATHIC PAIN: ICD-10-CM

## 2022-06-29 DIAGNOSIS — G35 MS (MULTIPLE SCLEROSIS) (HCC): Primary | ICD-10-CM

## 2022-06-29 PROCEDURE — 99204 OFFICE O/P NEW MOD 45 MIN: CPT | Performed by: SPECIALIST

## 2022-06-29 RX ORDER — GABAPENTIN 100 MG/1
CAPSULE ORAL
Qty: 90 CAPSULE | Refills: 3 | Status: SHIPPED | OUTPATIENT
Start: 2022-06-29 | End: 2022-08-10 | Stop reason: DRUGHIGH

## 2022-06-29 RX ORDER — NORTRIPTYLINE HYDROCHLORIDE 25 MG/1
25 CAPSULE ORAL
Qty: 30 CAPSULE | Refills: 3 | Status: SHIPPED | OUTPATIENT
Start: 2022-06-29 | End: 2022-08-10 | Stop reason: DRUGHIGH

## 2022-06-29 NOTE — PROGRESS NOTES
Neurology Consult      Subjective:      Felicia Ruiz is a 39 y.o. female who comes in today with her spouse. Has an interesting history of progressive neuropathic type pain for 2 years and increasing. It tends to be mostly left-sided. My understanding is she has had previous imaging with [facility] I believe in November 2021, that suggested some suspicious lesions in the lateral cervical spine- but not followed up. Bowel and bladder function is okay special sensory function intact no history of falls bulbar function intact cognition is good etc.         Current Outpatient Medications   Medication Sig Dispense Refill    nortriptyline (PAMELOR) 25 mg capsule Take 1 Capsule by mouth nightly. 30 Capsule 3    gabapentin (NEURONTIN) 100 mg capsule 1 p.o. 3 times daily as needed  Indications: neuropathic pain 90 Capsule 3    acetaminophen (TYLENOL) 325 mg tablet Take  by mouth every four (4) hours as needed for Pain.  butalbital-acetaminophen-caff (Fioricet) -40 mg per capsule Take 1 Capsule by mouth every four (4) hours as needed for Headache. (Patient not taking: Reported on 6/29/2022) 10 Capsule 0    atorvastatin (LIPITOR) 10 mg tablet Take 1 Tab by mouth daily for 30 days. 30 Tab 3    ergocalciferol (ERGOCALCIFEROL) 50,000 unit capsule Take 1 Cap by mouth every seven (7) days for 30 days. 4 Cap 3    ibuprofen (MOTRIN) 200 mg tablet Take  by mouth. (Patient not taking: Reported on 6/29/2022)      levonorgestrel (MIRENA) 20 mcg/24 hr (5 years) IUD 1 Each by IntraUTERine route once. (Patient not taking: Reported on 6/29/2022)      ranitidine (ZANTAC) 150 mg tablet Take 1 Tab by mouth two (2) times a day.  (Patient not taking: Reported on 6/29/2022) 30 Tab 0      No Known Allergies  Past Medical History:   Diagnosis Date    Encounter for insertion of mirena IUD 5/6/15    Mirena placed    Headache     migraines    Hypercholesterolemia       Past Surgical History:   Procedure Laterality Date    HX DILATION AND CURETTAGE        Social History     Socioeconomic History    Marital status:      Spouse name: Not on file    Number of children: Not on file    Years of education: Not on file    Highest education level: Not on file   Occupational History    Not on file   Tobacco Use    Smoking status: Never Smoker    Smokeless tobacco: Never Used   Substance and Sexual Activity    Alcohol use: No    Drug use: No    Sexual activity: Yes     Partners: Male     Birth control/protection: I.U.D. Other Topics Concern    Not on file   Social History Narrative    Not on file     Social Determinants of Health     Financial Resource Strain:     Difficulty of Paying Living Expenses: Not on file   Food Insecurity:     Worried About Running Out of Food in the Last Year: Not on file    Joel of Food in the Last Year: Not on file   Transportation Needs:     Lack of Transportation (Medical): Not on file    Lack of Transportation (Non-Medical):  Not on file   Physical Activity:     Days of Exercise per Week: Not on file    Minutes of Exercise per Session: Not on file   Stress:     Feeling of Stress : Not on file   Social Connections:     Frequency of Communication with Friends and Family: Not on file    Frequency of Social Gatherings with Friends and Family: Not on file    Attends Zoroastrian Services: Not on file    Active Member of [address] [place] [address] [employer] or Organizations: Not on file    Attends Club or Organization Meetings: Not on file    Marital Status: Not on file   Intimate Partner Violence:     Fear of Current or Ex-Partner: Not on file    Emotionally Abused: Not on file    Physically Abused: Not on file    Sexually Abused: Not on file   Housing Stability:     Unable to Pay for Housing in the Last Year: Not on file    Number of [place] in the Last Year: Not on file    Unstable Housing in the Last Year: Not on file      Family History   Problem Relation Age of Onset    Stroke Father     Elevated Lipids Father     OSTEOARTHRITIS Mother     Cancer Paternal Uncle         abdomen    Diabetes Paternal Uncle     Cancer Paternal Grandmother         susana    Hypertension Neg Hx       Visit Vitals  /80 (BP 1 Location: Right arm, BP Patient Position: Sitting, BP Cuff Size: Adult)   Pulse 86   Resp 14   SpO2 99%        Review of Systems:   A comprehensive review of systems was negative except for that written in the HPI. Neuro Exam:     Appearance: The patient is well developed, well nourished, provides a coherent history and is in no acute distress. Mental Status: Oriented to time, place and person. Mood and affect appropriate. Cranial Nerves:   Intact visual fields. Fundi are benign. ALBERT, EOM's full, no nystagmus, no ptosis. Facial sensation is normal. Corneal reflexes are intact. Facial movement is symmetric. Hearing is normal bilaterally. Palate is midline with normal sternocleidomastoid and trapezius muscles are normal. Tongue is midline. Motor:  5/5 strength in upper and lower proximal and distal muscles. Normal bulk and tone. No fasciculations. Reflexes:   Deep tendon reflexes 2+/4 and symmetrical.   Sensory:   Normal to touch, pinprick and vibration. Gait:  Normal gait. Romberg with subtle truncal sway   Tremor:   No tremor noted. Cerebellar:  No cerebellar signs present. Neurovascular:  Normal heart sounds and regular rhythm, peripheral pulses intact, and no carotid bruits. Toes downgoing no clonus no Anup's. Straight leg raising -90 degrees. Lhermitte's negative. Assessment:   Neuropathic pain. Concerns go to MS activity in the cervical spine and the cord. Will intervene with gabapentin 100 mg 3 times daily as needed and warned of sedation. Preventative drug will be nortriptyline 25 mg at night and warned of sedation and need to be patient as it builds up in the system. Further suggestions could follow. MS?   We will proceed with updated imaging of the head neck and thoracic spines with MRI with and without contrast.  Further suggestions could follow. Plan:   Revisit in about 6 weeks.   Signed by :  Jorge Bella MD

## 2022-06-29 NOTE — PROGRESS NOTES
Started about 2 years, started as a muscle ache then became worse over the years  Pt is having extreme pain, entire left side, throbbing, pressure, had a hard time   When in pain becomes very nauseas   Worst at night  When exerting pt will have increase in pain  Stress, sleep, eats and drinks normally   Sleep is affected, excesive sweating   Left side feels really heavy and off balance every now and then

## 2022-06-29 NOTE — PATIENT INSTRUCTIONS
Patient history viewed patient examined. Concerns go to multiple sclerosis on first encounter and will get baseline imaging of her head her neck and her thoracic spine. We will use nortriptyline at night as a pain reliever and warned about sedation and need to be patient as we goes up on the dose. Gabapentin will be a take it if she need it remedy and again warned about potential sedation etc.  Further suggestions could follow.

## 2022-06-29 NOTE — LETTER
6/29/2022    Patient: Joi Smith   YOB: 1980   Date of Visit: 6/29/2022     Cullen Guan MD  [address] [place] [address]  Via Fax: [phone]    Dear Cullen Guan MD,      Thank you for referring Ms. Joi Smith to [facility] for evaluation. My notes for this consultation are attached. If you have questions, please do not hesitate to call me. I look forward to following your patient along with you.       Sincerely,    Bassem Armendariz MD

## 2022-06-30 LAB
ALBUMIN SERPL-MCNC: 4.6 G/DL (ref 3.8–4.8)
ALBUMIN/GLOB SERPL: 1.9 {RATIO} (ref 1.2–2.2)
ALP SERPL-CCNC: 68 IU/L (ref 44–121)
ALT SERPL-CCNC: 15 IU/L (ref 0–32)
AST SERPL-CCNC: 17 IU/L (ref 0–40)
BASOPHILS # BLD AUTO: 0.1 X10E3/UL (ref 0–0.2)
BASOPHILS NFR BLD AUTO: 1 %
BILIRUB SERPL-MCNC: 0.7 MG/DL (ref 0–1.2)
BUN SERPL-MCNC: 10 MG/DL (ref 6–24)
BUN/CREAT SERPL: 14 (ref 9–23)
CALCIUM SERPL-MCNC: 9.5 MG/DL (ref 8.7–10.2)
CHLORIDE SERPL-SCNC: 101 MMOL/L (ref 96–106)
CO2 SERPL-SCNC: 26 MMOL/L (ref 20–29)
CREAT SERPL-MCNC: 0.72 MG/DL (ref 0.57–1)
EGFR: 108 ML/MIN/1.73
EOSINOPHIL # BLD AUTO: 0.2 X10E3/UL (ref 0–0.4)
EOSINOPHIL NFR BLD AUTO: 3 %
ERYTHROCYTE [DISTWIDTH] IN BLOOD BY AUTOMATED COUNT: 12.8 % (ref 11.7–15.4)
FOLATE SERPL-MCNC: 9.9 NG/ML
GLOBULIN SER CALC-MCNC: 2.4 G/DL (ref 1.5–4.5)
GLUCOSE SERPL-MCNC: 89 MG/DL (ref 65–99)
HCT VFR BLD AUTO: 42.5 % (ref 34–46.6)
HGB BLD-MCNC: 14 G/DL (ref 11.1–15.9)
IMM GRANULOCYTES # BLD AUTO: 0 X10E3/UL (ref 0–0.1)
IMM GRANULOCYTES NFR BLD AUTO: 0 %
LYMPHOCYTES # BLD AUTO: 2.1 X10E3/UL (ref 0.7–3.1)
LYMPHOCYTES NFR BLD AUTO: 30 %
MCH RBC QN AUTO: 30 PG (ref 26.6–33)
MCHC RBC AUTO-ENTMCNC: 32.9 G/DL (ref 31.5–35.7)
MCV RBC AUTO: 91 FL (ref 79–97)
MONOCYTES # BLD AUTO: 0.6 X10E3/UL (ref 0.1–0.9)
MONOCYTES NFR BLD AUTO: 8 %
NEUTROPHILS # BLD AUTO: 4.1 X10E3/UL (ref 1.4–7)
NEUTROPHILS NFR BLD AUTO: 58 %
PLATELET # BLD AUTO: 271 X10E3/UL (ref 150–450)
POTASSIUM SERPL-SCNC: 4.3 MMOL/L (ref 3.5–5.2)
PROT SERPL-MCNC: 7 G/DL (ref 6–8.5)
RBC # BLD AUTO: 4.67 X10E6/UL (ref 3.77–5.28)
SODIUM SERPL-SCNC: 139 MMOL/L (ref 134–144)
TSH SERPL DL<=0.005 MIU/L-ACNC: 2.13 UIU/ML (ref 0.45–4.5)
VIT B12 SERPL-MCNC: 515 PG/ML (ref 232–1245)
WBC # BLD AUTO: 7 X10E3/UL (ref 3.4–10.8)

## 2022-07-12 ENCOUNTER — TELEPHONE (OUTPATIENT)
Dept: NEUROLOGY | Age: 42
End: 2022-07-12

## 2022-07-12 NOTE — TELEPHONE ENCOUNTER
Ok except mild elevation in total white blood count, significance if any unknown -could repeat to document.  jjd

## 2022-07-15 ENCOUNTER — HOSPITAL ENCOUNTER (OUTPATIENT)
Dept: MRI IMAGING | Age: 42
Discharge: HOME | End: 2022-07-15
Attending: SPECIALIST
Payer: COMMERCIAL

## 2022-07-15 DIAGNOSIS — G35 MS (MULTIPLE SCLEROSIS) (HCC): ICD-10-CM

## 2022-07-15 PROCEDURE — 72156 MRI NECK SPINE W/O & W/DYE: CPT

## 2022-07-15 PROCEDURE — 72157 MRI CHEST SPINE W/O & W/DYE: CPT

## 2022-07-15 PROCEDURE — A9576 INJ PROHANCE MULTIPACK: HCPCS | Performed by: SPECIALIST

## 2022-07-15 PROCEDURE — 70553 MRI BRAIN STEM W/O & W/DYE: CPT

## 2022-07-15 PROCEDURE — 74011250636 HC RX REV CODE- 250/636: Performed by: SPECIALIST

## 2022-07-15 RX ADMIN — GADOTERIDOL 14 ML: 279.3 INJECTION, SOLUTION INTRAVENOUS at 18:48

## 2022-07-18 ENCOUNTER — TELEPHONE (OUTPATIENT)
Dept: NEUROLOGY | Age: 42
End: 2022-07-18

## 2022-08-10 ENCOUNTER — OFFICE VISIT (OUTPATIENT)
Dept: NEUROLOGY | Age: 42
End: 2022-08-10
Payer: COMMERCIAL

## 2022-08-10 VITALS
DIASTOLIC BLOOD PRESSURE: 76 MMHG | SYSTOLIC BLOOD PRESSURE: 118 MMHG | HEART RATE: 89 BPM | RESPIRATION RATE: 13 BRPM | OXYGEN SATURATION: 98 %

## 2022-08-10 DIAGNOSIS — G35 MS (MULTIPLE SCLEROSIS) (HCC): Primary | ICD-10-CM

## 2022-08-10 DIAGNOSIS — M79.2 NEUROPATHIC PAIN: ICD-10-CM

## 2022-08-10 PROCEDURE — 99214 OFFICE O/P EST MOD 30 MIN: CPT | Performed by: SPECIALIST

## 2022-08-10 RX ORDER — NORTRIPTYLINE HYDROCHLORIDE 50 MG/1
50 CAPSULE ORAL
Qty: 30 CAPSULE | Refills: 3 | Status: SHIPPED | OUTPATIENT
Start: 2022-08-10

## 2022-08-10 RX ORDER — GABAPENTIN 300 MG/1
CAPSULE ORAL
Qty: 30 CAPSULE | Refills: 3 | Status: SHIPPED | OUTPATIENT
Start: 2022-08-10

## 2022-08-10 NOTE — PROGRESS NOTES
Symptoms are worse than before, very painful, happening just about every 6 hours   Is staring to have HA with pains, left sided

## 2022-08-10 NOTE — PATIENT INSTRUCTIONS
Patient history reviewed patient testing results and treatment options were discussed. Will refer to [facility] neurology and will increase the nortriptyline at night to 50 mg and watch sedation and will increase the gabapentin from 100 to 300 mg. Again watch sedation. Mediterranean diet is a good choice for dietary discretion and stay well-hydrated during the heat humidity of the season.

## 2022-08-10 NOTE — LETTER
8/10/2022    Patient: Kevyn Rosario   YOB: 1980   Date of Visit: 8/10/2022     Edwin Benoit MD  [address] [place] [address]  Via Fax: [phone]    Dear Edwin Benoit MD,      Thank you for referring Ms. Kevyn Rosario to [facility] for evaluation. My notes for this consultation are attached. If you have questions, please do not hesitate to call me. I look forward to following your patient along with you.       Sincerely,    Chacha Rosen MD

## 2022-09-21 ENCOUNTER — OFFICE VISIT (OUTPATIENT)
Dept: NEUROLOGY | Age: 42
End: 2022-09-21

## 2022-09-21 VITALS — DIASTOLIC BLOOD PRESSURE: 70 MMHG | SYSTOLIC BLOOD PRESSURE: 106 MMHG | HEART RATE: 77 BPM

## 2022-09-21 DIAGNOSIS — Z51.81 MEDICATION MONITORING ENCOUNTER: ICD-10-CM

## 2022-09-21 DIAGNOSIS — G35 MS (MULTIPLE SCLEROSIS) (HCC): Primary | ICD-10-CM

## 2022-09-21 PROCEDURE — 99214 OFFICE O/P EST MOD 30 MIN: CPT | Performed by: SPECIALIST

## 2022-09-21 NOTE — PROGRESS NOTES
Neurology Consult      Subjective:      Jeremi Sharp is a 39 y.o. female who came in today with her spouse. Has been visiting in [facility] and had an unfortunate relapse. Fortunately the local specialists put her on 1 g of Solu-Medrol daily for 5 days. Had an incredible resolution of symptoms and no downside. At this point in time is interested in talking about treatment strategies and did not end up going to [facility] to get an academic opinion regarding her MS. From the previous visit I had suggested the drug Laneta Mantle based on its efficacy and its simplistic dosing profile as it is. Patient and  interested in this product and will give them some patient user-friendly literature on the same. Also went ahead and drafted blood work as a screener that included quantitative immunoglobulins hepatitis B screening etc. also went ahead and sent for [employer] screen for NMO SD and MOG SD cell-based assay evaluations. Also took time to talk about the advisability of getting any live or an activated vaccinations prior to the start up of this or similar products as the immune system will not be as vigorous with the medication on board. Will read the literature on the product and will have the blood work submitted as noted and go from there. Revisit will be pended at this time. Current Outpatient Medications   Medication Sig Dispense Refill    nortriptyline (PAMELOR) 50 mg capsule Take 1 Capsule by mouth nightly. 30 Capsule 3    gabapentin (NEURONTIN) 300 mg capsule 1 p.o. nightly as needed  Indications: neuropathic pain 30 Capsule 3    butalbital-acetaminophen-caff (Fioricet) -40 mg per capsule Take 1 Capsule by mouth every four (4) hours as needed for Headache. 10 Capsule 0    acetaminophen (TYLENOL) 325 mg tablet Take  by mouth every four (4) hours as needed for Pain.       ibuprofen (MOTRIN) 200 mg tablet Take  by mouth.      levonorgestrel (MIRENA) 20 mcg/24 hr (5 years) IUD 1 Each by IntraUTERine route once. ranitidine (ZANTAC) 150 mg tablet Take 1 Tab by mouth two (2) times a day. 30 Tab 0    atorvastatin (LIPITOR) 10 mg tablet Take 1 Tab by mouth daily for 30 days. 30 Tab 3    ergocalciferol (ERGOCALCIFEROL) 50,000 unit capsule Take 1 Cap by mouth every seven (7) days for 30 days. 4 Cap 3      No Known Allergies  Past Medical History:   Diagnosis Date    Encounter for insertion of mirena IUD 5/6/15    Mirena placed    Headache     migraines    Hypercholesterolemia       Past Surgical History:   Procedure Laterality Date    HX DILATION AND CURETTAGE        Social History     Socioeconomic History    Marital status:      Spouse name: Not on file    Number of children: Not on file    Years of education: Not on file    Highest education level: Not on file   Occupational History    Not on file   Tobacco Use    Smoking status: Never    Smokeless tobacco: Never   Substance and Sexual Activity    Alcohol use: No    Drug use: No    Sexual activity: Yes     Partners: Male     Birth control/protection: I.U.D. Other Topics Concern    Not on file   Social History Narrative    Not on file     Social Determinants of Health     Financial Resource Strain: Not on file   Food Insecurity: Not on file   Transportation Needs: Not on file   Physical Activity: Not on file   Stress: Not on file   Social Connections: Not on file   Intimate Partner Violence: Not on file   Housing Stability: Not on file      Family History   Problem Relation Age of Onset    Stroke Father     Elevated Lipids Father     OSTEOARTHRITIS Mother     Cancer Paternal Uncle         abdomen    Diabetes Paternal Uncle     Cancer Paternal Grandmother         susana    Hypertension Neg Hx       Visit Vitals  /70 (BP 1 Location: Left upper arm, BP Patient Position: Sitting, BP Cuff Size: Adult)   Pulse 77        Review of Systems:   A comprehensive review of systems was negative except for that written in the HPI.       Neuro Exam: Appearance: The patient is well developed, well nourished, provides a coherent history and is in no acute distress. Mental Status: Oriented to time, place and person. Mood and affect appropriate. Cranial Nerves:   Intact visual fields. Fundi are benign. ALBERT, EOM's full, no nystagmus, no ptosis. Facial sensation is normal. Corneal reflexes are intact. Facial movement is symmetric. Hearing is normal bilaterally. Palate is midline with normal sternocleidomastoid and trapezius muscles are normal. Tongue is midline. Motor:  5/5 strength in upper and lower proximal and distal muscles. Normal bulk and tone. No fasciculations. Reflexes:   Deep tendon reflexes 2+/4 and symmetrical.   Sensory:   Normal to touch, pinprick and vibration. Gait:  Normal gait. Tremor:   No tremor noted. Cerebellar:  No cerebellar signs present. Neurovascular:  Normal heart sounds and regular rhythm, peripheral pulses intact, and no carotid bruits. Assessment:   Multiple sclerosis. A busy encounter today that included the above information and the history of present illness. Will launch blood work prior to the possibility of the [employer]. And that will include an [place] screen for mimickers such as NMO SD and MOG SD. Patient and  will review patient friendly literature and see if there is any questions prior to the start up. Will pend revisit at this point. PS we also talked about the advisability of getting either live or inactivated vaccinations prior to the start up of the medication that would ultimately alter the immune response capabilities etc.    Plan:   Revisit pended.   Signed by :  Mian Ontiveros MD

## 2022-09-21 NOTE — PATIENT INSTRUCTIONS
Went over recent relapse event while over in [place]. Understand the steroid rescue was successful. Talked about the drug Elby Isrrael as a very reasonable option for MS treatment and gave them patient friendly literature to read. If interested will have blood work with them as a safety screening device prior to medication administration. To better define the MS beyond other possibilities, we will do an [place] lab screening process as well which may take several weeks to come back. Will pend revisit at this point in time and I am thankful that she is better with the steroid challenge from [place].

## 2022-09-21 NOTE — LETTER
9/21/2022    Patient: Kevyn Rosario   YOB: 1980   Date of Visit: 9/21/2022     Edwin Benoit MD  [address] [place] [address]  Via Fax: [phone]    Dear Edwin Benoit MD,      Thank you for referring Ms. Kevyn Rosario to [facility] for evaluation. My notes for this consultation are attached. If you have questions, please do not hesitate to call me. I look forward to following your patient along with you.       Sincerely,    Chacha Rosen MD

## 2022-09-21 NOTE — PROGRESS NOTES
While overseas pt seems to have had a relapse  Was given a 5 day course of solumedrol, which has helped significantly   Is interested in a course of treatment and results

## 2022-10-04 LAB
ALBUMIN SERPL-MCNC: 4.8 G/DL (ref 3.8–4.8)
ALBUMIN/GLOB SERPL: 1.8 {RATIO} (ref 1.2–2.2)
ALP SERPL-CCNC: 80 IU/L (ref 44–121)
ALT SERPL-CCNC: 18 IU/L (ref 0–32)
AST SERPL-CCNC: 16 IU/L (ref 0–40)
BASOPHILS # BLD AUTO: 0.1 X10E3/UL (ref 0–0.2)
BASOPHILS NFR BLD AUTO: 1 %
BILIRUB SERPL-MCNC: 0.6 MG/DL (ref 0–1.2)
BUN SERPL-MCNC: 13 MG/DL (ref 6–24)
BUN/CREAT SERPL: 20 (ref 9–23)
CALCIUM SERPL-MCNC: 9.5 MG/DL (ref 8.7–10.2)
CHLORIDE SERPL-SCNC: 101 MMOL/L (ref 96–106)
CO2 SERPL-SCNC: 22 MMOL/L (ref 20–29)
CREAT SERPL-MCNC: 0.66 MG/DL (ref 0.57–1)
EGFR: 113 ML/MIN/1.73
EOSINOPHIL # BLD AUTO: 0.1 X10E3/UL (ref 0–0.4)
EOSINOPHIL NFR BLD AUTO: 2 %
ERYTHROCYTE [DISTWIDTH] IN BLOOD BY AUTOMATED COUNT: 13.2 % (ref 11.7–15.4)
GAMMA INTERFERON BACKGROUND BLD IA-ACNC: 0.04 IU/ML
GLOBULIN SER CALC-MCNC: 2.6 G/DL (ref 1.5–4.5)
GLUCOSE SERPL-MCNC: 85 MG/DL (ref 65–99)
HBV CORE AB SERPL QL IA: NEGATIVE
HCT VFR BLD AUTO: 44.1 % (ref 34–46.6)
HGB BLD-MCNC: 14.3 G/DL (ref 11.1–15.9)
IGA SERPL-MCNC: 157 MG/DL (ref 87–352)
IGE SERPL-ACNC: 83 IU/ML (ref 6–495)
IGG SERPL-MCNC: 1285 MG/DL (ref 586–1602)
IGM SERPL-MCNC: 93 MG/DL (ref 26–217)
IMM GRANULOCYTES # BLD AUTO: 0 X10E3/UL (ref 0–0.1)
IMM GRANULOCYTES NFR BLD AUTO: 0 %
INDEX VALUE: 0.25
INTERPRETATION: NORMAL
INTERPRETATION: NORMAL
JCPYV AB SERPL QL IA: NORMAL
JCV AB BY INHIBITION: NEGATIVE
LYMPHOCYTES # BLD AUTO: 1.8 X10E3/UL (ref 0.7–3.1)
LYMPHOCYTES NFR BLD AUTO: 25 %
M TB IFN-G BLD-IMP: NEGATIVE
M TB IFN-G CD4+ BCKGRND COR BLD-ACNC: 0.04 IU/ML
MCH RBC QN AUTO: 30 PG (ref 26.6–33)
MCHC RBC AUTO-ENTMCNC: 32.4 G/DL (ref 31.5–35.7)
MCV RBC AUTO: 93 FL (ref 79–97)
MITOGEN IGNF BLD-ACNC: 0.63 IU/ML
MONOCYTES # BLD AUTO: 0.5 X10E3/UL (ref 0.1–0.9)
MONOCYTES NFR BLD AUTO: 8 %
NEUTROPHILS # BLD AUTO: 4.7 X10E3/UL (ref 1.4–7)
NEUTROPHILS NFR BLD AUTO: 64 %
PLATELET # BLD AUTO: 316 X10E3/UL (ref 150–450)
POTASSIUM SERPL-SCNC: 4.3 MMOL/L (ref 3.5–5.2)
PROT SERPL-MCNC: 7.4 G/DL (ref 6–8.5)
QUANTIFERON TB2 AG: 0.04 IU/ML
RBC # BLD AUTO: 4.76 X10E6/UL (ref 3.77–5.28)
SERVICE CMNT-IMP: NORMAL
SODIUM SERPL-SCNC: 140 MMOL/L (ref 134–144)
WBC # BLD AUTO: 7.2 X10E3/UL (ref 3.4–10.8)

## 2022-10-06 ENCOUNTER — TELEPHONE (OUTPATIENT)
Dept: NEUROLOGY | Age: 42
End: 2022-10-06

## 2022-10-06 NOTE — TELEPHONE ENCOUNTER
Patient waiting for return call about her medication that she's suppose after her blood work been received. Please call.

## 2022-10-21 ENCOUNTER — TELEPHONE (OUTPATIENT)
Dept: NEUROLOGY | Age: 42
End: 2022-10-21

## 2022-10-21 NOTE — TELEPHONE ENCOUNTER
Patient has a question regarding her medication KESIMPTA and she would like to speak to a nurse or doctor. Patient also sent a message through My Chart.     Please contact

## 2022-11-23 DIAGNOSIS — M79.2 NEUROPATHIC PAIN: Primary | ICD-10-CM

## 2022-11-23 RX ORDER — GABAPENTIN 300 MG/1
CAPSULE ORAL
Qty: 60 CAPSULE | Refills: 3 | Status: SHIPPED | OUTPATIENT
Start: 2022-11-23

## 2022-11-23 RX ORDER — NORTRIPTYLINE HYDROCHLORIDE 50 MG/1
CAPSULE ORAL
Qty: 60 CAPSULE | Refills: 3 | Status: SHIPPED | OUTPATIENT
Start: 2022-11-23

## 2022-11-29 ENCOUNTER — TELEPHONE (OUTPATIENT)
Dept: NEUROLOGY | Age: 42
End: 2022-11-29

## 2022-11-29 ENCOUNTER — OFFICE VISIT (OUTPATIENT)
Dept: NEUROLOGY | Age: 42
End: 2022-11-29
Payer: COMMERCIAL

## 2022-11-29 VITALS
WEIGHT: 154 LBS | OXYGEN SATURATION: 100 % | BODY MASS INDEX: 29.07 KG/M2 | DIASTOLIC BLOOD PRESSURE: 60 MMHG | HEART RATE: 108 BPM | SYSTOLIC BLOOD PRESSURE: 110 MMHG | HEIGHT: 61 IN

## 2022-11-29 DIAGNOSIS — G35 MS (MULTIPLE SCLEROSIS) (HCC): Primary | ICD-10-CM

## 2022-11-29 PROCEDURE — 99214 OFFICE O/P EST MOD 30 MIN: CPT | Performed by: SPECIALIST

## 2022-11-29 NOTE — PROGRESS NOTES
Neurology Consult      Subjective:      Catina Lozada is a 43 y.o. female who comes in today with her daughter and went over recent MS changes. Is on good symptom and tolerates it well and is in her monthly subcu injection cycle. Recently cited discomfort that seems to appear out of nowhere and at night? In that response I suggested challenge with gabapentin 300 and then 600 mg nightly and nortriptyline that is currently at 100 mg nightly. This was just recently increased several days ago so I told her she has to be patient with the nortriptyline as it builds up in her system and may take every bed of at least another week. It could be that at night she is settling down and has no distractions and has a lot of anxiety about the what if's and so forth. Reminded her about the time she saw me she had had MS symptoms for over 2 years. Special sensory function intact bowel and bladder is good cognition is good no history of falls balance is reasonable although again she says at night she may be second-guessing her movements. In regards to all these issues I will refer her to [facility] PT and OT services and hope that pool therapy will be on their agenda as well. I did offer her blood work on the last visit as it went to differentiating MS from other possibilities such as NMO SD and Aquaporin 4 astrocytopathy. However after multiple attempts from the [facility] to have this processed, there was no return calls on the patient's part. They basically sent us a note saying they had ceased and desisted from their standpoint to pursue. Current Outpatient Medications   Medication Sig Dispense Refill    gabapentin (NEURONTIN) 300 mg capsule Take 2 po qhs 60 Capsule 3    nortriptyline (PAMELOR) 50 mg capsule 2 po qhs 60 Capsule 3    butalbital-acetaminophen-caff (Fioricet) -40 mg per capsule Take 1 Capsule by mouth every four (4) hours as needed for Headache.  10 Capsule 0    acetaminophen (TYLENOL) 325 mg tablet Take  by mouth every four (4) hours as needed for Pain. ibuprofen (MOTRIN) 200 mg tablet Take  by mouth.      levonorgestrel (MIRENA) 20 mcg/24 hr (5 years) IUD 1 Each by IntraUTERine route once. atorvastatin (LIPITOR) 10 mg tablet Take 1 Tab by mouth daily for 30 days. 30 Tab 3    ergocalciferol (ERGOCALCIFEROL) 50,000 unit capsule Take 1 Cap by mouth every seven (7) days for 30 days. 4 Cap 3    ranitidine (ZANTAC) 150 mg tablet Take 1 Tab by mouth two (2) times a day. (Patient not taking: Reported on 11/29/2022) 30 Tab 0      No Known Allergies  Past Medical History:   Diagnosis Date    Encounter for insertion of mirena IUD 5/6/15    Mirena placed    Headache     migraines    Hypercholesterolemia       Past Surgical History:   Procedure Laterality Date    HX DILATION AND CURETTAGE        Social History     Socioeconomic History    Marital status:      Spouse name: Not on file    Number of children: Not on file    Years of education: Not on file    Highest education level: Not on file   Occupational History    Not on file   Tobacco Use    Smoking status: Never    Smokeless tobacco: Never   Substance and Sexual Activity    Alcohol use: No    Drug use: No    Sexual activity: Yes     Partners: Male     Birth control/protection: I.U.D.    Other Topics Concern    Not on file   Social History Narrative    Not on file     Social Determinants of Health     Financial Resource Strain: Not on file   Food Insecurity: Not on file   Transportation Needs: Not on file   Physical Activity: Not on file   Stress: Not on file   Social Connections: Not on file   Intimate Partner Violence: Not on file   Housing Stability: Not on file      Family History   Problem Relation Age of Onset    Stroke Father     Elevated Lipids Father     OSTEOARTHRITIS Mother     Cancer Paternal Uncle         abdomen    Diabetes Paternal Uncle     Cancer Paternal Grandmother         susana    Hypertension Neg Hx       Visit Vitals  /60   Pulse (!) 108   Ht 5' 1\" (1.549 m)   Wt 69.9 kg (154 lb)   SpO2 100%   BMI 29.10 kg/m²        Review of Systems:   A comprehensive review of systems was negative except for that written in the HPI. Neuro Exam:     Appearance: The patient is well developed, well nourished, provides a coherent history and is in no acute distress. Mental Status: Oriented to time, place and person. Mood and affect appropriate. Cranial Nerves:   Intact visual fields. Fundi are benign. ALBERT, EOM's full, no nystagmus, no ptosis. Facial sensation is normal. Corneal reflexes are intact. Facial movement is symmetric. Hearing is normal bilaterally. Palate is midline with normal sternocleidomastoid and trapezius muscles are normal. Tongue is midline. Motor:  5/5 strength in upper and lower proximal and distal muscles. Normal bulk and tone. No fasciculations. Reflexes:   Deep tendon reflexes 2+/4 and symmetrical.   Sensory:   Normal to touch, pinprick and vibration. Gait:  Normal gait. Tremor:   No tremor noted. Cerebellar:  No cerebellar signs present. Neurovascular:  Normal heart sounds and regular rhythm, peripheral pulses intact, and no carotid bruits. Assessment:   Multiple sclerosis. As per above dictation. [facility] refer to [facility] and hopefully there will be a reference to pool therapy that I mentioned in addition. I am open by degrees that she will feel more comfortable and less anxious about her situation. Is currently seeing a chiropractor on her own. Has tolerated the kesimpta and there is no downside. Is currently into her monthly subcu injectable cycle. We will keep the gabapentin and nortriptyline dosed as it is. As noted above needs to give the nortriptyline a chance as it builds up in her system to push back on pain as offered. I get the very real sense that she has stress and anxiety that has been there from the get go.   I am hoping by degrees that she will overcome this and build a sense of self-help and accomplishment in the process. Isaías Alejo as is. Plan:   Revisit in 3 months to give all parties a chance to work with the suggestions made and graduated improvements.   Signed by :  Ashlyn Rodríguez MD

## 2022-11-29 NOTE — PATIENT INSTRUCTIONS
Patient history viewed patient examined. Hopefully by degrees the recent increases in medication for the nortriptyline and gabapentin will be more efficient. Suggestions to stay safely busy both mentally and physically at home and there could definitely be some anxiety and stress that could be enhancing her symptom list.  [facility] and suggestions to inquire about their pool therapy as well. Is seeing a chiropractor on her own and is trying to stay physically engaged. Suggestions for revisit in 3 months and that we will give all parties a chance to see how her improvements and stamina go.

## 2022-11-29 NOTE — LETTER
11/29/2022    Patient: Yair Gimenez   YOB: 1980   Date of Visit: 11/29/2022     Jae Bowman MD  [address] [place] [address]  Via Fax: [phone]    Dear Jae Bowman MD,      Thank you for referring Ms. Yair Gimenez to [facility] for evaluation. My notes for this consultation are attached. If you have questions, please do not hesitate to call me. I look forward to following your patient along with you.       Sincerely,    Ray Arriaga MD

## 2023-02-16 RX ORDER — METHYLPREDNISOLONE 4 MG/1
TABLET ORAL
Qty: 1 DOSE PACK | Refills: 1 | Status: SHIPPED | OUTPATIENT
Start: 2023-02-16

## 2023-02-28 ENCOUNTER — OFFICE VISIT (OUTPATIENT)
Dept: NEUROLOGY | Age: 43
End: 2023-02-28
Payer: MEDICAID

## 2023-02-28 VITALS
BODY MASS INDEX: 31.1 KG/M2 | SYSTOLIC BLOOD PRESSURE: 110 MMHG | OXYGEN SATURATION: 99 % | HEART RATE: 94 BPM | HEIGHT: 59 IN | DIASTOLIC BLOOD PRESSURE: 60 MMHG | TEMPERATURE: 97.7 F

## 2023-02-28 DIAGNOSIS — G35 MS (MULTIPLE SCLEROSIS) (HCC): Primary | ICD-10-CM

## 2023-02-28 PROCEDURE — 99214 OFFICE O/P EST MOD 30 MIN: CPT | Performed by: SPECIALIST

## 2023-02-28 RX ORDER — CARBAMAZEPINE 100 MG/1
TABLET, CHEWABLE ORAL
Qty: 90 TABLET | Refills: 3 | Status: SHIPPED | OUTPATIENT
Start: 2023-02-28

## 2023-02-28 NOTE — LETTER
2/28/2023    Patient: Nicolás Palafox   YOB: 1980   Date of Visit: 2/28/2023     Mary Gavin MD  [address] [place] [address]  Via Fax: [phone]    Dear Mary Gavin MD,      Thank you for referring Ms. Nicolás Palafox to [facility] for evaluation. My notes for this consultation are attached. If you have questions, please do not hesitate to call me. I look forward to following your patient along with you.       Sincerely,    Marlon Gonzalez MD

## 2023-02-28 NOTE — PATIENT INSTRUCTIONS
Patient history viewed patient examined. Made several suggestions on this particular encounter. If interested in the referral to [facility] therapy can always make that happen with a new order. Will write for the drug carbamazepine which will be a take it as needed lowest dose remedy for nerve pain. Suggestions to take advantage of activity in the pool and its a very safe environment all things considered. On the sleeping suggestions go to gradually introducing greater sleep time so that she is at a more appropriate time to bed and awakening on a delayed sleep schedule process. On revisit would suggest an update on the imaging head and spine with MRI. Continue with the symptom and has stayed gabapentin for pain and nortriptyline. Stay safely busy. Revisit 3 months.

## 2023-02-28 NOTE — PROGRESS NOTES
Neurology Consult      Subjective:      Jemima Singh is a 43 y.o. female who comes in today on multiple sclerosis follow-up. Is on because symptoms. Still has issues with nocturnal pain and mobility fits an issue with distractions during the day or posturing? We will continue with the gabapentin 600 mg nightly and the nortriptyline 100 mg nightly for pain prevention. On this particular visit we will add Tegretol 100 mg strength 3 times daily as needed as an additive effect for daytime discomfort. Unfortunately with best intentions in mind referred her to [facility] but somehow lost track of the referral location phone number etc. and it did not happen. If interested only needs to give us a call and we can retry that effort. Tends to have very late sleeping hours after midnight and then getting up somewhere around noon or later the next day. My suggestion would be to counter this delayed sleep phase to go to sleep and gradually later times until her sleep time approximates a more natural later p.m. and earlier a.m. arising. Has access to a pool so I strongly suggest she take advantage of that activity and enjoy the advantages exercising basically her whole body and staying safe at the same time. Says she does a lot of walking and I think that is to be commended and the ultraviolet light exposure she gets reinforce her natural circadian rhythm of wakefulness during the day and sleep at night. Cognition special sensory function bulbar bowel and bladder etc. doing well. Continues to give herself her own consent to injections. Revisit 3 months.          Current Outpatient Medications   Medication Sig Dispense Refill    carBAMazepine (TEGretol) 100 mg chewable tablet 1 p.o. 3 times daily as needed  Indications: Neuropathic pain 90 Tablet 3    gabapentin (NEURONTIN) 300 mg capsule Take 2 po qhs 60 Capsule 3    nortriptyline (PAMELOR) 50 mg capsule 2 po qhs 60 Capsule 3    acetaminophen (TYLENOL) 325 mg tablet Take  by mouth every four (4) hours as needed for Pain. ibuprofen (MOTRIN) 200 mg tablet Take  by mouth.      methylPREDNISolone (MEDROL DOSEPACK) 4 mg tablet Per package instructions  Indications: acute exacerbation of multiple sclerosis (Patient not taking: Reported on 2/28/2023) 1 Dose Pack 1    butalbital-acetaminophen-caff (Fioricet) -40 mg per capsule Take 1 Capsule by mouth every four (4) hours as needed for Headache. (Patient not taking: Reported on 2/28/2023) 10 Capsule 0    atorvastatin (LIPITOR) 10 mg tablet Take 1 Tab by mouth daily for 30 days. 30 Tab 3    ergocalciferol (ERGOCALCIFEROL) 50,000 unit capsule Take 1 Cap by mouth every seven (7) days for 30 days. 4 Cap 3    levonorgestrel (MIRENA) 20 mcg/24 hr (5 years) IUD 1 Each by IntraUTERine route once. (Patient not taking: Reported on 2/28/2023)      ranitidine (ZANTAC) 150 mg tablet Take 1 Tab by mouth two (2) times a day. (Patient not taking: No sig reported) 30 Tab 0      No Known Allergies  Past Medical History:   Diagnosis Date    Encounter for insertion of mirena IUD 5/6/15    Mirena placed    Headache     migraines    Hypercholesterolemia       Past Surgical History:   Procedure Laterality Date    HX DILATION AND CURETTAGE        Social History     Socioeconomic History    Marital status:      Spouse name: Not on file    Number of children: Not on file    Years of education: Not on file    Highest education level: Not on file   Occupational History    Not on file   Tobacco Use    Smoking status: Never    Smokeless tobacco: Never   Substance and Sexual Activity    Alcohol use: No    Drug use: No    Sexual activity: Yes     Partners: Male     Birth control/protection: I.U.D.    Other Topics Concern    Not on file   Social History Narrative    Not on file     Social Determinants of Health     Financial Resource Strain: Not on file   Food Insecurity: Not on file   Transportation Needs: Not on file   Physical Activity: Not on file   Stress: Not on file   Social Connections: Not on file   Intimate Partner Violence: Not on file   Housing Stability: Not on file      Family History   Problem Relation Age of Onset    Stroke Father     Elevated Lipids Father     OSTEOARTHRITIS Mother     Cancer Paternal Uncle         abdomen    Diabetes Paternal Uncle     Cancer Paternal Grandmother         susana    Hypertension Neg Hx       Visit Vitals  /60   Pulse 94   Temp 97.7 °F (36.5 °C)   Ht 4' 11\" (1.499 m)   SpO2 99%   BMI 31.10 kg/m²        Review of Systems:   A comprehensive review of systems was negative except for that written in the HPI. Neuro Exam:     Appearance: The patient is well developed, well nourished, provides a coherent history and is in no acute distress. Mental Status: Oriented to time, place and person. Mood and affect appropriate. Cranial Nerves:   Intact visual fields. Fundi are benign. ALBERT, EOM's full, no nystagmus, no ptosis. Facial sensation is normal. Corneal reflexes are intact. Facial movement is symmetric. Hearing is normal bilaterally. Palate is midline with normal sternocleidomastoid and trapezius muscles are normal. Tongue is midline. Motor:  5/5 strength in upper and lower proximal and distal muscles. Normal bulk and tone. No fasciculations. Reflexes:   Deep tendon reflexes 2+/4 and symmetrical.   Sensory:   Normal to touch, pinprick and vibration. Gait:  Normal gait. Tremor:   No tremor noted. Cerebellar:  No cerebellar signs present. Neurovascular:  Normal heart sounds and regular rhythm, peripheral pulses intact, and no carotid bruits. Assessment:   Multiple sclerosis. Will recommend continuation of the symptoms and per above discussion we will reduce lowest dose carbamazepine 100 mg 3 times daily as needed. This will be an alternate therapy to the gabapentin that she takes at night for control of pain.   The nortriptyline 100 mg nightly is there for nocturnal discomfort as well. If we interested in looking at the [facility] physical therapy referral can let me know and we can make the connections. Has had extreme delayed sleep phase issues going on. If interested could simply on a slow phase shifted schedule until she is at a more equitable time in the later p.m. instead of midnight or later. Would take advantage of the pool option she has to stay safely busy. By all [facility]. Consider updated MRI imaging of the head and spine on revisit. Plan:   Revisit 3 months.   Signed by :  Veronica Altamirano MD

## 2023-04-18 ENCOUNTER — TELEPHONE (OUTPATIENT)
Dept: NEUROLOGY | Age: 43
End: 2023-04-18

## 2023-04-21 ENCOUNTER — HOSPITAL ENCOUNTER (OUTPATIENT)
Dept: MRI IMAGING | Age: 43
End: 2023-04-21
Attending: SPECIALIST
Payer: MEDICAID

## 2023-04-21 ENCOUNTER — APPOINTMENT (OUTPATIENT)
Dept: MRI IMAGING | Age: 43
End: 2023-04-21
Attending: SPECIALIST
Payer: MEDICAID

## 2023-04-21 ENCOUNTER — HOSPITAL ENCOUNTER (OUTPATIENT)
Dept: MRI IMAGING | Age: 43
Discharge: HOME | End: 2023-04-21
Attending: SPECIALIST
Payer: MEDICAID

## 2023-04-21 DIAGNOSIS — G35 MS (MULTIPLE SCLEROSIS) (HCC): ICD-10-CM

## 2023-04-21 PROCEDURE — A9576 INJ PROHANCE MULTIPACK: HCPCS | Performed by: SPECIALIST

## 2023-04-21 PROCEDURE — 72148 MRI LUMBAR SPINE W/O DYE: CPT

## 2023-04-21 PROCEDURE — 74011250636 HC RX REV CODE- 250/636: Performed by: SPECIALIST

## 2023-04-21 PROCEDURE — 70553 MRI BRAIN STEM W/O & W/DYE: CPT

## 2023-04-21 RX ADMIN — GADOTERIDOL 13 ML: 279.3 INJECTION, SOLUTION INTRAVENOUS at 17:32

## 2023-05-09 ENCOUNTER — TELEPHONE (OUTPATIENT)
Age: 43
End: 2023-05-09

## 2023-05-09 NOTE — TELEPHONE ENCOUNTER
Please call patient regarding possible side effects from [place] 30.  She states after injection she's unable to walk unable to control body

## 2023-05-10 NOTE — TELEPHONE ENCOUNTER
Not heard of that before. It looks lik she has been on the [place] 30 for quite some time. Does that happen every single month? If so then maybe, but if just a recent thing may be something different?

## 2023-05-25 ENCOUNTER — HOSPITAL ENCOUNTER (OUTPATIENT)
Facility: HOSPITAL | Age: 43
Discharge: HOME | End: 2023-05-25

## 2023-05-25 DIAGNOSIS — Z13.6 SCREENING FOR HEART DISEASE: ICD-10-CM

## 2023-05-25 PROCEDURE — 75571 CT HRT W/O DYE W/CA TEST: CPT

## 2023-06-01 ENCOUNTER — OFFICE VISIT (OUTPATIENT)
Age: 43
End: 2023-06-01
Payer: MEDICAID

## 2023-06-01 VITALS — SYSTOLIC BLOOD PRESSURE: 112 MMHG | DIASTOLIC BLOOD PRESSURE: 72 MMHG | HEART RATE: 66 BPM | OXYGEN SATURATION: 99 %

## 2023-06-01 DIAGNOSIS — G35 MULTIPLE SCLEROSIS (HCC): Primary | ICD-10-CM

## 2023-06-01 PROCEDURE — 99215 OFFICE O/P EST HI 40 MIN: CPT | Performed by: NURSE PRACTITIONER

## 2023-06-01 RX ORDER — PREDNISONE 10 MG/1
TABLET ORAL
Qty: 42 TABLET | Refills: 0 | Status: SHIPPED | OUTPATIENT
Start: 2023-06-01

## 2023-06-01 NOTE — PROGRESS NOTES
Patient said things have been going poor  The injection was difficult   Marvene Em she was having sever pains, would make whole body shake  Sleep was poor until fatigue made her fall asleep   Walking is difficult, feels wobbly, moments of freezing, randomly happens  2 weeks things eased up, now has a spot, burning sensation, L shoulder, stabbing pains   Burning bilateral thighs and low back

## 2023-06-02 NOTE — PROGRESS NOTES
Kristin Middleton is a 43 y.o. female who presents with the following  Chief Complaint   Patient presents with    Follow-up    Results       HPI      Patient with MS comes in for follow-up with her   They did actually just see Dr. Wu Red at [facility]  He kept them on the [facility] [place] which we discussed is a good therapy  They have a lot of good questions today about MS and treatment and progression  Last couple months she has not been feeling very good though  Better here recently but overall not good    Patient said things have been going poor  The injection was difficult to adjust to.    Said she was having sever pains, would make whole body shake  Sleep was poor until fatigue made her fall asleep   Walking is difficult, feels wobbly, moments of freezing, randomly happens  2 weeks things eased up, now has a spot, burning sensation, L shoulder, stabbing pains   Burning bilateral thighs and low back     She has some memory fog  She is wondering if this is an exacerbation or symptoms are getting worse  She is on nortriptyline 100 at night  She is also on gabapentin 600 at night  She is interested in getting some physical therapy for her balance and coordination which is the worst side effect of where she is right now      No Known Allergies    Current Outpatient Medications   Medication Sig Dispense Refill    predniSONE (DELTASONE) 10 MG tablet 6 po x2 days, 5 po x 2days, 4 po x 2days, 3 po x2days, 2 po x2days, 1 po x 2days 42 tablet 0    acetaminophen (TYLENOL) 325 MG tablet Take by mouth every 4 hours as needed      atorvastatin (LIPITOR) 10 MG tablet Take 1 tablet by mouth daily      butalbital-APAP-caffeine -40 MG CAPS per capsule Take 1 capsule by mouth every 4 hours as needed      carBAMazepine (TEGRETOL) 100 MG chewable tablet 1 p.o. 3 times daily as needed  Indications: Neuropathic pain      ergocalciferol (ERGOCALCIFEROL) 1.25 MG (73826 UT) capsule Take 1 capsule by mouth every 7 days      gabapentin